# Patient Record
Sex: MALE | Race: WHITE | NOT HISPANIC OR LATINO | URBAN - METROPOLITAN AREA
[De-identification: names, ages, dates, MRNs, and addresses within clinical notes are randomized per-mention and may not be internally consistent; named-entity substitution may affect disease eponyms.]

---

## 2017-06-14 ENCOUNTER — OUTPATIENT (OUTPATIENT)
Dept: OUTPATIENT SERVICES | Facility: HOSPITAL | Age: 57
LOS: 1 days | Discharge: HOME | End: 2017-06-14

## 2017-06-22 DIAGNOSIS — M26.02 MAXILLARY HYPOPLASIA: ICD-10-CM

## 2017-06-22 DIAGNOSIS — E65 LOCALIZED ADIPOSITY: ICD-10-CM

## 2017-06-22 DIAGNOSIS — F32.9 MAJOR DEPRESSIVE DISORDER, SINGLE EPISODE, UNSPECIFIED: ICD-10-CM

## 2017-06-22 DIAGNOSIS — E78.5 HYPERLIPIDEMIA, UNSPECIFIED: ICD-10-CM

## 2017-06-22 DIAGNOSIS — I10 ESSENTIAL (PRIMARY) HYPERTENSION: ICD-10-CM

## 2017-06-22 DIAGNOSIS — M89.8X8 OTHER SPECIFIED DISORDERS OF BONE, OTHER SITE: ICD-10-CM

## 2021-01-16 ENCOUNTER — TRANSCRIPTION ENCOUNTER (OUTPATIENT)
Age: 61
End: 2021-01-16

## 2022-01-06 ENCOUNTER — OUTPATIENT (OUTPATIENT)
Dept: OUTPATIENT SERVICES | Facility: HOSPITAL | Age: 62
LOS: 1 days | End: 2022-01-06
Payer: COMMERCIAL

## 2022-01-06 DIAGNOSIS — Z20.828 CONTACT WITH AND (SUSPECTED) EXPOSURE TO OTHER VIRAL COMMUNICABLE DISEASES: ICD-10-CM

## 2022-01-06 LAB — SARS-COV-2 RNA SPEC QL NAA+PROBE: DETECTED

## 2022-01-06 PROCEDURE — U0003: CPT

## 2022-01-06 PROCEDURE — C9803: CPT

## 2022-01-06 PROCEDURE — U0005: CPT

## 2022-01-07 DIAGNOSIS — Z20.828 CONTACT WITH AND (SUSPECTED) EXPOSURE TO OTHER VIRAL COMMUNICABLE DISEASES: ICD-10-CM

## 2022-06-30 ENCOUNTER — NON-APPOINTMENT (OUTPATIENT)
Age: 62
End: 2022-06-30

## 2022-07-02 PROBLEM — Z00.00 ENCOUNTER FOR PREVENTIVE HEALTH EXAMINATION: Status: ACTIVE | Noted: 2022-07-02

## 2022-07-12 ENCOUNTER — APPOINTMENT (OUTPATIENT)
Dept: ORTHOPEDIC SURGERY | Facility: CLINIC | Age: 62
End: 2022-07-12

## 2022-07-12 VITALS — HEIGHT: 64 IN | WEIGHT: 160 LBS | BODY MASS INDEX: 27.31 KG/M2

## 2022-07-12 DIAGNOSIS — Z78.9 OTHER SPECIFIED HEALTH STATUS: ICD-10-CM

## 2022-07-12 DIAGNOSIS — M70.21 OLECRANON BURSITIS, RIGHT ELBOW: ICD-10-CM

## 2022-07-12 DIAGNOSIS — E78.00 PURE HYPERCHOLESTEROLEMIA, UNSPECIFIED: ICD-10-CM

## 2022-07-12 PROCEDURE — 99204 OFFICE O/P NEW MOD 45 MIN: CPT

## 2022-07-12 PROCEDURE — 73080 X-RAY EXAM OF ELBOW: CPT | Mod: RT

## 2022-07-12 RX ORDER — NAPROXEN 500 MG/1
500 TABLET ORAL
Qty: 60 | Refills: 2 | Status: ACTIVE | COMMUNITY
Start: 2022-07-12 | End: 1900-01-01

## 2022-07-12 RX ORDER — DICLOFENAC SODIUM 20 MG/G
2 SOLUTION TOPICAL
Qty: 1 | Refills: 2 | Status: ACTIVE | COMMUNITY
Start: 2022-07-12 | End: 1900-01-01

## 2022-07-12 NOTE — IMAGING
Lorri Abbasi  Identified pt with two pt identifiers(name and ). Chief Complaint   Patient presents with    Fibromyalgia     back pain/ ease pain but doesn't get rid of it/ took 500mg naproxen took rid of pain       Reviewed record In preparation for visit and have obtained necessary documentation. Has info on advanced directive but has not filled them out. 1. Have you been to the ER, urgent care clinic or hospitalized since your last visit? No     2. Have you seen or consulted any other health care providers outside of the [address] [place] [address] since your last visit? Include any pap smears or colon screening. No    Vitals reviewed with provider. Health Maintenance reviewed:     Health Maintenance Due   Topic    Pneumococcal 65+ Low/Medium Risk (2 of 2 - PPSV23)    Influenza Age 5 to Adult    ions  Wt Readings from Last 3 Encounters:   18 230 lb 6.4 oz (104.5 kg)   18 231 lb (104.8 kg)   18 228 lb (103.4 kg)   hip with someone who physically or mentally threatens you? N N   Is it safe for you to go home? Pietro Abebe    7/3/2018                        Temp Readings from Last 3 Encounters:   18 98.1 °F (36.7 °C) (Oral)   18 98.2 °F (36.8 °C) (Oral)   18 98.3 °F (36.8 °C) (Oral)               No Help Needed                   Climbing a flight of stairs    Getting to places beyond walking distances        7/3/2018 2018    N N   Fall Risk Assessment, last 12 mths 7/3/2018   Able to walk? Yes   Fall in past 12 months? Yes   Fall with injury?  No   Number of falls in past 12 months 1   Fall Risk Score 1    N N    Y Y [Left] : left elbow [de-identified] : The patient is a well appearing 61 year old M of their stated age.\par Neck is supple & nontender to palpation. Negative Spurling's test.\par \par Right Shoulder:       	\par ROM:\par Forward Flexion: 180 degrees\par Abduction: 180 degrees\par ER at 90: 90 degrees\par IR at 90: 45 degrees\par ER at N: 50 degrees\par Motor:\par Abduction: 5 out of 5\par FPS: 5 out of 5\par Flexion: 5 out of 5\par Internal Rotation: 5 out of 5\par External Rotation: 5 out of 5\par Provocative Testing:\par Impingement: Negative\par [place]'s: Negative\par Other: N/A\par \par Left Shoulder:\par ROM:\par Forward Flexion: 180 degrees\par Abduction: 180 degrees\par ER at 90: 90 degrees\par IR at 90: 45 degrees\par ER at N: 50 degrees\par Motor:\par Abduction: 5 out of 5\par FPS: 5 out of 5\par Flexion: 5 out of 5\par Internal Rotation: 5 out of 5\par External Rotation: 5 out of 5\par Provocative Testing:\par Impingement: Negative\par [place]'s: Negative\par Other: N/A\par _____________________\par Effected Elbow: right \par ROM:\par Flexion: 0-135 degrees\par Supination: 90 degrees\par Pronation: 90 degrees\par \par Inspection:\par Erythema: None\par Ecchymosis: None\par Abrasions: None\par Effusion: None\par Deformity: None\par \par Palpation:\par Crepitus: None\par Medial Epicondyle/Flexor-Pronator: Nontender\par Lateral Epicondyle/ECRB: Nontender\par Olecranon: Nontender\par Radial Head: Nontender\par Distal Biceps: Nontender\par Distal Triceps:  Nontender\par Flexor-Pronator: Nontender\par Extensor/ECRB: Nontender\par UCL: Nontender\par Pronator Intersection: Nontender\par Ulnar Nerve:  Stable & Nontender\par \par Stress Testing:\par Varus at 0 Degrees: Stable\par Varus at 30 Degrees: Stable\par Valgus at 0 Degrees: Stable\par Valgus at 30 Degrees: Stable\par \par Motor:\par Elbow Flexion: 5 out of 5\par Elbow Extension: 5 out of 5\par Supination: 5 out of 5\par Pronation: 5 out of 5\par Wrist Flexion: 5 out of 5\par Wrist Extension: 5 out of 5\par Interossei: 5 out of 5\par : 5 out of 5\par \par Provocative Testing:\par Milking: Negative\par Moving Valgus Stress: Negative\par Posterolateral R-I: Negative\par Hook Test: Negative\par Resisted Wrist Extension: No Pain\par Resisted Index Finger Extension: No Pain\par Resisted Middle Finger Extension: No Pain\par Resisted Wrist Flexion: No Pain\par Resisted Pronation: No Pain\par \par Neurologic Exam:\par Axillary Nerve:  SLT\par Radial Nerve: SLT\par Median Nerve: SLT\par Ulnar Nerve:  SLT\par Other:  N/A\par Vascular Exam:\par Radial Pulse: 2+\par Ulnar Pulse: 2+\par Capillary Refill: <2 Seconds\par Other Exams: None\par Pertinent Contralateral Elbow Findings: None\par \par Assessment: The patient is a 61 year old M with right elbow pain and radiographic and physical exam findings consistent with olecranon bursitis \par \par The patient’s condition is acute\par Documents/Results Reviewed Today: Xray right elbow \par Tests/Studies Independently Interpreted Today: Xray right elbow shows elbow arthritis and large olecranon spur\par Pertinent findings include: 0-135, 90/90\par Confounding medical conditions/concerns: Hx of right elbow bursitis\par \par Plan: PAtient provided with script for PT. They will wear reparel elbow sleeve. \par Tests Ordered: none\par Prescription Medications Ordered: Pennsaid, Naprosyn\par Braces/DME Ordered: none\par Activity/Work/Sports Status:  \par Additional Instructions: none\par Follow-Up: 4wks\par \par \par The patient's current medication management of their orthopedic diagnosis was reviewed today:\par (1) We discussed a comprehensive treatment plan that included possible pharmaceutical management involving the use of prescription strength medications including but not limited to options such as oral Naprosyn 500mg BID, once daily Meloxicam 15 mg, or 500-650 mg Tylenol versus over the counter oral medications and topical prescription NSAID Pennsaid vs over the counter Voltaren gel.\par (2) There is a moderate risk of morbidity with further treatment, especially from use of prescription strength medications and possible side effects of these medications which include upset stomach with oral medications, skin reactions to topical medications and cardiac/renal issues with long term use.\par (3) I recommended that the patient follow-up with their medical physician to discuss any significant specific potential issues with long term medication use such as interactions with current medications or with exacerbation of underlying medical comorbidities.\par (4) The benefits and risks associated with use of injectable, oral or topical, prescription and over the counter anti-inflammatory medications were discussed with the patient. The patient voiced understanding of the risks including but not limited to bleeding, stroke, kidney dysfunction, heart disease, and were referred to the black box warning label for further information.\par \par \par I, Marcelo Vargas, attest that this documentation has been prepared under the direction and in the presence of Provider Dr. Mojica\par \par The documentation recorded by the scribe accurately reflects the service I personally performed and the decisions made by me.\par \par \par  [FreeTextEntry1] : elbow arthritis and large olecranon spur

## 2022-07-12 NOTE — HISTORY OF PRESENT ILLNESS
[de-identified] : The patient is a 61 year old right hand dominant male  who presents today complaining of right elbow pain.  \par Date of Injury/Onset: 6/28/22\par Pain:    At Rest: 0/10 \par With Activity:  2/10 \par Mechanism of injury: Patient noticed sudden swelling over the right elbow. No JEFFREY. \par This is not a Work Related Injury being treated under Worker's Compensation.\par This is not an athletic injury occurring associated with an interscholastic or organized sports team.\par Quality of symptoms: soreness and localized swelling\par Improves with: rest\par Worse with: leaning, heavy lifting\par Prior treatment: Was here for similar scenario 3-4 years ago. Got it drained. Had to come back 3 more times for in\par Prior Imaging: None recent\par Out of work/sport: [No], since [n/a]\par School/Sport/Position/Occupation: \par Additional Information: [None]

## 2022-08-29 ENCOUNTER — APPOINTMENT (OUTPATIENT)
Dept: ORTHOPEDIC SURGERY | Facility: CLINIC | Age: 62
End: 2022-08-29

## 2023-03-27 ENCOUNTER — APPOINTMENT (OUTPATIENT)
Dept: UROLOGY | Facility: CLINIC | Age: 63
End: 2023-03-27
Payer: COMMERCIAL

## 2023-04-11 ENCOUNTER — APPOINTMENT (OUTPATIENT)
Dept: UROLOGY | Facility: CLINIC | Age: 63
End: 2023-04-11
Payer: COMMERCIAL

## 2023-04-11 VITALS
OXYGEN SATURATION: 98 % | SYSTOLIC BLOOD PRESSURE: 145 MMHG | HEIGHT: 64 IN | DIASTOLIC BLOOD PRESSURE: 92 MMHG | WEIGHT: 158 LBS | HEART RATE: 58 BPM | BODY MASS INDEX: 26.98 KG/M2 | RESPIRATION RATE: 16 BRPM

## 2023-04-11 DIAGNOSIS — N52.9 MALE ERECTILE DYSFUNCTION, UNSPECIFIED: ICD-10-CM

## 2023-04-11 DIAGNOSIS — N40.0 BENIGN PROSTATIC HYPERPLASIA WITHOUT LOWER URINARY TRACT SYMPMS: ICD-10-CM

## 2023-04-11 PROCEDURE — 99204 OFFICE O/P NEW MOD 45 MIN: CPT

## 2023-04-11 RX ORDER — TADALAFIL 5 MG/1
5 TABLET ORAL
Qty: 30 | Refills: 11 | Status: ACTIVE | COMMUNITY
Start: 2023-04-11 | End: 1900-01-01

## 2023-04-16 PROBLEM — N40.0 BPH WITHOUT OBSTRUCTION/LOWER URINARY TRACT SYMPTOMS: Status: ACTIVE | Noted: 2023-04-16

## 2023-04-18 NOTE — HISTORY OF PRESENT ILLNESS
[FreeTextEntry1] : This patient presents with erectile dysfunction.  He has tried tadalafil in the past with some but not complete results.  He denies any lower tract symptoms at this time. He states that his PSA and T are fine. PMHx includes HTN. PSHx is denied. Allergies to medications are denied. Family history shows no HTN in mother or father. Tobacco and ETOH use are both denied.

## 2023-04-18 NOTE — END OF VISIT
[FreeTextEntry3] : I called in Tadalafil at the 5mg dose to be taken on a prn basis. He will follow up with a duplex scan of the penis.

## 2023-04-18 NOTE — PHYSICAL EXAM
[General Appearance - Well Developed] : well developed [General Appearance - Well Nourished] : well nourished [Normal Appearance] : normal appearance [Well Groomed] : well groomed [General Appearance - In No Acute Distress] : no acute distress [Edema] : no peripheral edema [Respiration, Rhythm And Depth] : normal respiratory rhythm and effort [Exaggerated Use Of Accessory Muscles For Inspiration] : no accessory muscle use [Abdomen Soft] : soft [Abdomen Tenderness] : non-tender [Costovertebral Angle Tenderness] : no ~M costovertebral angle tenderness [Urethral Meatus] : meatus normal [Urinary Bladder Findings] : the bladder was normal on palpation [Scrotum] : the scrotum was normal [Testes Mass (___cm)] : there were no testicular masses [No Prostate Nodules] : no prostate nodules [Normal Station and Gait] : the gait and station were normal for the patient's age [] : no rash [No Focal Deficits] : no focal deficits [Oriented To Time, Place, And Person] : oriented to person, place, and time [Affect] : the affect was normal [Mood] : the mood was normal [Not Anxious] : not anxious [No Palpable Adenopathy] : no palpable adenopathy [FreeTextEntry1] : Normal male genitalia and a palpably benign prostate are noted

## 2023-05-15 ENCOUNTER — APPOINTMENT (OUTPATIENT)
Dept: UROLOGY | Facility: CLINIC | Age: 63
End: 2023-05-15

## 2023-11-29 ENCOUNTER — APPOINTMENT (OUTPATIENT)
Dept: ORTHOPEDIC SURGERY | Facility: CLINIC | Age: 63
End: 2023-11-29
Payer: COMMERCIAL

## 2023-11-29 VITALS — WEIGHT: 160 LBS | HEIGHT: 63 IN | BODY MASS INDEX: 28.35 KG/M2

## 2023-11-29 PROCEDURE — 99204 OFFICE O/P NEW MOD 45 MIN: CPT

## 2023-11-29 RX ORDER — ATORVASTATIN CALCIUM 80 MG/1
TABLET, FILM COATED ORAL
Refills: 0 | Status: ACTIVE | COMMUNITY

## 2023-11-29 RX ORDER — QUINAPRIL HYDROCHLORIDE 5 MG/1
TABLET, FILM COATED ORAL
Refills: 0 | Status: ACTIVE | COMMUNITY

## 2024-01-03 ENCOUNTER — OUTPATIENT (OUTPATIENT)
Dept: OUTPATIENT SERVICES | Facility: HOSPITAL | Age: 64
LOS: 1 days | End: 2024-01-03
Payer: COMMERCIAL

## 2024-01-03 ENCOUNTER — RESULT REVIEW (OUTPATIENT)
Age: 64
End: 2024-01-03

## 2024-01-03 VITALS
HEIGHT: 62 IN | SYSTOLIC BLOOD PRESSURE: 142 MMHG | HEART RATE: 61 BPM | RESPIRATION RATE: 16 BRPM | TEMPERATURE: 98 F | WEIGHT: 158.73 LBS | DIASTOLIC BLOOD PRESSURE: 75 MMHG | OXYGEN SATURATION: 98 %

## 2024-01-03 DIAGNOSIS — Z98.890 OTHER SPECIFIED POSTPROCEDURAL STATES: Chronic | ICD-10-CM

## 2024-01-03 DIAGNOSIS — Z01.818 ENCOUNTER FOR OTHER PREPROCEDURAL EXAMINATION: ICD-10-CM

## 2024-01-03 DIAGNOSIS — Z29.9 ENCOUNTER FOR PROPHYLACTIC MEASURES, UNSPECIFIED: ICD-10-CM

## 2024-01-03 DIAGNOSIS — Z87.81 PERSONAL HISTORY OF (HEALED) TRAUMATIC FRACTURE: Chronic | ICD-10-CM

## 2024-01-03 LAB
ABO RH CONFIRMATION: SIGNIFICANT CHANGE UP
ABO RH CONFIRMATION: SIGNIFICANT CHANGE UP
ALBUMIN SERPL ELPH-MCNC: 3.9 G/DL — SIGNIFICANT CHANGE UP (ref 3.3–5)
ALBUMIN SERPL ELPH-MCNC: 3.9 G/DL — SIGNIFICANT CHANGE UP (ref 3.3–5)
ANION GAP SERPL CALC-SCNC: 2 MMOL/L — LOW (ref 5–17)
ANION GAP SERPL CALC-SCNC: 2 MMOL/L — LOW (ref 5–17)
APTT BLD: 33.5 SEC — SIGNIFICANT CHANGE UP (ref 24.5–35.6)
APTT BLD: 33.5 SEC — SIGNIFICANT CHANGE UP (ref 24.5–35.6)
BASOPHILS # BLD AUTO: 0.04 K/UL — SIGNIFICANT CHANGE UP (ref 0–0.2)
BASOPHILS # BLD AUTO: 0.04 K/UL — SIGNIFICANT CHANGE UP (ref 0–0.2)
BASOPHILS NFR BLD AUTO: 0.5 % — SIGNIFICANT CHANGE UP (ref 0–2)
BASOPHILS NFR BLD AUTO: 0.5 % — SIGNIFICANT CHANGE UP (ref 0–2)
BLD GP AB SCN SERPL QL: SIGNIFICANT CHANGE UP
BLD GP AB SCN SERPL QL: SIGNIFICANT CHANGE UP
BUN SERPL-MCNC: 28 MG/DL — HIGH (ref 7–23)
BUN SERPL-MCNC: 28 MG/DL — HIGH (ref 7–23)
CALCIUM SERPL-MCNC: 9.3 MG/DL — SIGNIFICANT CHANGE UP (ref 8.5–10.1)
CALCIUM SERPL-MCNC: 9.3 MG/DL — SIGNIFICANT CHANGE UP (ref 8.5–10.1)
CHLORIDE SERPL-SCNC: 107 MMOL/L — SIGNIFICANT CHANGE UP (ref 96–108)
CHLORIDE SERPL-SCNC: 107 MMOL/L — SIGNIFICANT CHANGE UP (ref 96–108)
CO2 SERPL-SCNC: 29 MMOL/L — SIGNIFICANT CHANGE UP (ref 22–31)
CO2 SERPL-SCNC: 29 MMOL/L — SIGNIFICANT CHANGE UP (ref 22–31)
CREAT SERPL-MCNC: 0.94 MG/DL — SIGNIFICANT CHANGE UP (ref 0.5–1.3)
CREAT SERPL-MCNC: 0.94 MG/DL — SIGNIFICANT CHANGE UP (ref 0.5–1.3)
EGFR: 92 ML/MIN/1.73M2 — SIGNIFICANT CHANGE UP
EGFR: 92 ML/MIN/1.73M2 — SIGNIFICANT CHANGE UP
EOSINOPHIL # BLD AUTO: 0.11 K/UL — SIGNIFICANT CHANGE UP (ref 0–0.5)
EOSINOPHIL # BLD AUTO: 0.11 K/UL — SIGNIFICANT CHANGE UP (ref 0–0.5)
EOSINOPHIL NFR BLD AUTO: 1.4 % — SIGNIFICANT CHANGE UP (ref 0–6)
EOSINOPHIL NFR BLD AUTO: 1.4 % — SIGNIFICANT CHANGE UP (ref 0–6)
GLUCOSE SERPL-MCNC: 90 MG/DL — SIGNIFICANT CHANGE UP (ref 70–99)
GLUCOSE SERPL-MCNC: 90 MG/DL — SIGNIFICANT CHANGE UP (ref 70–99)
HCT VFR BLD CALC: 42.1 % — SIGNIFICANT CHANGE UP (ref 39–50)
HCT VFR BLD CALC: 42.1 % — SIGNIFICANT CHANGE UP (ref 39–50)
HGB BLD-MCNC: 14.1 G/DL — SIGNIFICANT CHANGE UP (ref 13–17)
HGB BLD-MCNC: 14.1 G/DL — SIGNIFICANT CHANGE UP (ref 13–17)
IMM GRANULOCYTES NFR BLD AUTO: 0.5 % — SIGNIFICANT CHANGE UP (ref 0–0.9)
IMM GRANULOCYTES NFR BLD AUTO: 0.5 % — SIGNIFICANT CHANGE UP (ref 0–0.9)
INR BLD: 0.86 RATIO — SIGNIFICANT CHANGE UP (ref 0.85–1.18)
INR BLD: 0.86 RATIO — SIGNIFICANT CHANGE UP (ref 0.85–1.18)
LYMPHOCYTES # BLD AUTO: 1.77 K/UL — SIGNIFICANT CHANGE UP (ref 1–3.3)
LYMPHOCYTES # BLD AUTO: 1.77 K/UL — SIGNIFICANT CHANGE UP (ref 1–3.3)
LYMPHOCYTES # BLD AUTO: 22.8 % — SIGNIFICANT CHANGE UP (ref 13–44)
LYMPHOCYTES # BLD AUTO: 22.8 % — SIGNIFICANT CHANGE UP (ref 13–44)
MCHC RBC-ENTMCNC: 29.6 PG — SIGNIFICANT CHANGE UP (ref 27–34)
MCHC RBC-ENTMCNC: 29.6 PG — SIGNIFICANT CHANGE UP (ref 27–34)
MCHC RBC-ENTMCNC: 33.5 GM/DL — SIGNIFICANT CHANGE UP (ref 32–36)
MCHC RBC-ENTMCNC: 33.5 GM/DL — SIGNIFICANT CHANGE UP (ref 32–36)
MCV RBC AUTO: 88.3 FL — SIGNIFICANT CHANGE UP (ref 80–100)
MCV RBC AUTO: 88.3 FL — SIGNIFICANT CHANGE UP (ref 80–100)
MONOCYTES # BLD AUTO: 0.59 K/UL — SIGNIFICANT CHANGE UP (ref 0–0.9)
MONOCYTES # BLD AUTO: 0.59 K/UL — SIGNIFICANT CHANGE UP (ref 0–0.9)
MONOCYTES NFR BLD AUTO: 7.6 % — SIGNIFICANT CHANGE UP (ref 2–14)
MONOCYTES NFR BLD AUTO: 7.6 % — SIGNIFICANT CHANGE UP (ref 2–14)
NEUTROPHILS # BLD AUTO: 5.21 K/UL — SIGNIFICANT CHANGE UP (ref 1.8–7.4)
NEUTROPHILS # BLD AUTO: 5.21 K/UL — SIGNIFICANT CHANGE UP (ref 1.8–7.4)
NEUTROPHILS NFR BLD AUTO: 67.2 % — SIGNIFICANT CHANGE UP (ref 43–77)
NEUTROPHILS NFR BLD AUTO: 67.2 % — SIGNIFICANT CHANGE UP (ref 43–77)
PLATELET # BLD AUTO: 234 K/UL — SIGNIFICANT CHANGE UP (ref 150–400)
PLATELET # BLD AUTO: 234 K/UL — SIGNIFICANT CHANGE UP (ref 150–400)
POTASSIUM SERPL-MCNC: 4.1 MMOL/L — SIGNIFICANT CHANGE UP (ref 3.5–5.3)
POTASSIUM SERPL-MCNC: 4.1 MMOL/L — SIGNIFICANT CHANGE UP (ref 3.5–5.3)
POTASSIUM SERPL-SCNC: 4.1 MMOL/L — SIGNIFICANT CHANGE UP (ref 3.5–5.3)
POTASSIUM SERPL-SCNC: 4.1 MMOL/L — SIGNIFICANT CHANGE UP (ref 3.5–5.3)
PROTHROM AB SERPL-ACNC: 9.8 SEC — SIGNIFICANT CHANGE UP (ref 9.5–13)
PROTHROM AB SERPL-ACNC: 9.8 SEC — SIGNIFICANT CHANGE UP (ref 9.5–13)
RBC # BLD: 4.77 M/UL — SIGNIFICANT CHANGE UP (ref 4.2–5.8)
RBC # BLD: 4.77 M/UL — SIGNIFICANT CHANGE UP (ref 4.2–5.8)
RBC # FLD: 12.4 % — SIGNIFICANT CHANGE UP (ref 10.3–14.5)
RBC # FLD: 12.4 % — SIGNIFICANT CHANGE UP (ref 10.3–14.5)
SODIUM SERPL-SCNC: 138 MMOL/L — SIGNIFICANT CHANGE UP (ref 135–145)
SODIUM SERPL-SCNC: 138 MMOL/L — SIGNIFICANT CHANGE UP (ref 135–145)
WBC # BLD: 7.76 K/UL — SIGNIFICANT CHANGE UP (ref 3.8–10.5)
WBC # BLD: 7.76 K/UL — SIGNIFICANT CHANGE UP (ref 3.8–10.5)
WBC # FLD AUTO: 7.76 K/UL — SIGNIFICANT CHANGE UP (ref 3.8–10.5)
WBC # FLD AUTO: 7.76 K/UL — SIGNIFICANT CHANGE UP (ref 3.8–10.5)

## 2024-01-03 PROCEDURE — 71046 X-RAY EXAM CHEST 2 VIEWS: CPT

## 2024-01-03 PROCEDURE — 82040 ASSAY OF SERUM ALBUMIN: CPT

## 2024-01-03 PROCEDURE — 87641 MR-STAPH DNA AMP PROBE: CPT

## 2024-01-03 PROCEDURE — 83036 HEMOGLOBIN GLYCOSYLATED A1C: CPT

## 2024-01-03 PROCEDURE — 93005 ELECTROCARDIOGRAM TRACING: CPT

## 2024-01-03 PROCEDURE — 80048 BASIC METABOLIC PNL TOTAL CA: CPT

## 2024-01-03 PROCEDURE — 93010 ELECTROCARDIOGRAM REPORT: CPT

## 2024-01-03 PROCEDURE — 99214 OFFICE O/P EST MOD 30 MIN: CPT | Mod: 25

## 2024-01-03 PROCEDURE — 85730 THROMBOPLASTIN TIME PARTIAL: CPT

## 2024-01-03 PROCEDURE — 86900 BLOOD TYPING SEROLOGIC ABO: CPT

## 2024-01-03 PROCEDURE — 85025 COMPLETE CBC W/AUTO DIFF WBC: CPT

## 2024-01-03 PROCEDURE — 85610 PROTHROMBIN TIME: CPT

## 2024-01-03 PROCEDURE — 86850 RBC ANTIBODY SCREEN: CPT

## 2024-01-03 PROCEDURE — 36415 COLL VENOUS BLD VENIPUNCTURE: CPT

## 2024-01-03 PROCEDURE — 86901 BLOOD TYPING SEROLOGIC RH(D): CPT

## 2024-01-03 PROCEDURE — 87640 STAPH A DNA AMP PROBE: CPT

## 2024-01-03 PROCEDURE — 71046 X-RAY EXAM CHEST 2 VIEWS: CPT | Mod: 26

## 2024-01-03 NOTE — H&P PST ADULT - NSANTHAGERD_ENT_A_CORE
Script sent to pt's pharmacy    PDMP reviewed; no aberrant behavior identified, prescription authorized.   Yes

## 2024-01-03 NOTE — H&P PST ADULT - ASSESSMENT
63 y.o male scheduled for a Left Total Hip  63 y.o male scheduled for a Left Total Hip Replacement  Plan  1. Stop all NSAIDS, herbal supplements and vitamins for 7 days.  2. NPO at midnight.  3. Take the following medications --none--- with small sips of water on the morning of your procedure/surgery.  4. Use EZ sponges as directed  5. Use mupirocin as directed  6. Labs, EKG, CXR, MRSA swab as per surgeon  7. PMD MONET Coreas and Dr Ruiz cardiologist visit for optimization prior to surgery as per surgeon.  8. Joint class completed today  9. Patient elzbieta require a rolling walker at home due to their dx of arthritis to help complete the MRADL's   10. Preprocedure education provided     CAPRINI SCORE    AGE RELATED RISK FACTORS                                                       MOBILITY RELATED FACTORS  [ ] Age 41-60 years                                            (1 Point)                  [ ] Bed rest                                                        (1 Point)  [x ] Age: 61-74 years                                           (2 Points)                [ ] Plaster cast                                                   (2 Points)  [ ] Age= 75 years                                              (3 Points)                 [ ] Bed bound for more than 72 hours                   (2 Points)    DISEASE RELATED RISK FACTORS                                               GENDER SPECIFIC FACTORS  [x ] Edema in the lower extremities                       (1 Point)                  [ ] Pregnancy                                                     (1 Point)  [ ] Varicose veins                                               (1 Point)                  [ ] Post-partum < 6 weeks                                   (1 Point)             [ x] BMI > 25 Kg/m2                                            (1 Point)                  [ ] Hormonal therapy  or oral contraception            (1 Point)                 [ ] Sepsis (in the previous month)                        (1 Point)                  [ ] History of pregnancy complications  [ ] Pneumonia or serious lung disease                                               [ ] Unexplained or recurrent                       (1 Point)           (in the previous month)                               (1 Point)  [ ] Abnormal pulmonary function test                     (1 Point)                 SURGERY RELATED RISK FACTORS  [ ] Acute myocardial infarction                              (1 Point)                 [ ]  Section                                            (1 Point)  [ ] Congestive heart failure (in the previous month)  (1 Point)                 [ ] Minor surgery                                                 (1 Point)   [ ] Inflammatory bowel disease                             (1 Point)                 [ ] Arthroscopic surgery                                        (2 Points)  [ ] Central venous access                                    (2 Points)                [ ] General surgery lasting more than 45 minutes   (2 Points)       [ ] Stroke (in the previous month)                          (5 Points)               [x ] Elective arthroplasty                                        (5 Points)                                                                                                                                               HEMATOLOGY RELATED FACTORS                                                 TRAUMA RELATED RISK FACTORS  [ ] Prior episodes of VTE                                     (3 Points)                 [ ] Fracture of the hip, pelvis, or leg                       (5 Points)  [ ] Positive family history for VTE                         (3 Points)                 [ ] Acute spinal cord injury (in the previous month)  (5 Points)  [ ] Prothrombin 81582 A                                      (3 Points)                 [ ] Paralysis  (less than 1 month)                          (5 Points)  [ ] Factor V Leiden                                             (3 Points)                 [ ] Multiple Trauma within 1 month                         (5 Points)  [ ] Lupus anticoagulants                                     (3 Points)                                                           [ ] Anticardiolipin antibodies                                (3 Points)                                                       [ ] High homocysteine in the blood                      (3 Points)                                             [ ] Other congenital or acquired thrombophilia       (3 Points)                                                [ ] Heparin induced thrombocytopenia                  (3 Points)                                          Total Score [        9  ]    The Caprini score indicates that this patient is at high risk for a VTE event (score > = 6).    Surgical patients in this group will benefit from both pharmacologic prophylaxis and intermittent compression devices.    The surgical team will determine the balance between VTE risk and bleeding risk, and other clinical considerations.  63 y.o male scheduled for a Left Total Hip Replacement  Plan  1. Stop all NSAIDS, herbal supplements and vitamins for 7 days.  2. NPO at midnight.  3. Take the following medications --none--- with small sips of water on the morning of your procedure/surgery.  4. Use EZ sponges as directed  5. Use mupirocin as directed  6. Labs, EKG, CXR, MRSA swab as per surgeon  7. PMD MONET Coreas and Dr Ruiz cardiologist visit for optimization prior to surgery as per surgeon.  8. Joint class completed today  9. Patient elzbieta require a rolling walker at home due to their dx of arthritis to help complete the MRADL's   10. Preprocedure education provided     CAPRINI SCORE    AGE RELATED RISK FACTORS                                                       MOBILITY RELATED FACTORS  [ ] Age 41-60 years                                            (1 Point)                  [ ] Bed rest                                                        (1 Point)  [x ] Age: 61-74 years                                           (2 Points)                [ ] Plaster cast                                                   (2 Points)  [ ] Age= 75 years                                              (3 Points)                 [ ] Bed bound for more than 72 hours                   (2 Points)    DISEASE RELATED RISK FACTORS                                               GENDER SPECIFIC FACTORS  [x ] Edema in the lower extremities                       (1 Point)                  [ ] Pregnancy                                                     (1 Point)  [ ] Varicose veins                                               (1 Point)                  [ ] Post-partum < 6 weeks                                   (1 Point)             [ x] BMI > 25 Kg/m2                                            (1 Point)                  [ ] Hormonal therapy  or oral contraception            (1 Point)                 [ ] Sepsis (in the previous month)                        (1 Point)                  [ ] History of pregnancy complications  [ ] Pneumonia or serious lung disease                                               [ ] Unexplained or recurrent                       (1 Point)           (in the previous month)                               (1 Point)  [ ] Abnormal pulmonary function test                     (1 Point)                 SURGERY RELATED RISK FACTORS  [ ] Acute myocardial infarction                              (1 Point)                 [ ]  Section                                            (1 Point)  [ ] Congestive heart failure (in the previous month)  (1 Point)                 [ ] Minor surgery                                                 (1 Point)   [ ] Inflammatory bowel disease                             (1 Point)                 [ ] Arthroscopic surgery                                        (2 Points)  [ ] Central venous access                                    (2 Points)                [ ] General surgery lasting more than 45 minutes   (2 Points)       [ ] Stroke (in the previous month)                          (5 Points)               [x ] Elective arthroplasty                                        (5 Points)                                                                                                                                               HEMATOLOGY RELATED FACTORS                                                 TRAUMA RELATED RISK FACTORS  [ ] Prior episodes of VTE                                     (3 Points)                 [ ] Fracture of the hip, pelvis, or leg                       (5 Points)  [ ] Positive family history for VTE                         (3 Points)                 [ ] Acute spinal cord injury (in the previous month)  (5 Points)  [ ] Prothrombin 91540 A                                      (3 Points)                 [ ] Paralysis  (less than 1 month)                          (5 Points)  [ ] Factor V Leiden                                             (3 Points)                 [ ] Multiple Trauma within 1 month                         (5 Points)  [ ] Lupus anticoagulants                                     (3 Points)                                                           [ ] Anticardiolipin antibodies                                (3 Points)                                                       [ ] High homocysteine in the blood                      (3 Points)                                             [ ] Other congenital or acquired thrombophilia       (3 Points)                                                [ ] Heparin induced thrombocytopenia                  (3 Points)                                          Total Score [        9  ]    The Caprini score indicates that this patient is at high risk for a VTE event (score > = 6).    Surgical patients in this group will benefit from both pharmacologic prophylaxis and intermittent compression devices.    The surgical team will determine the balance between VTE risk and bleeding risk, and other clinical considerations.

## 2024-01-03 NOTE — H&P PST ADULT - HISTORY OF PRESENT ILLNESS
63 y.o WD, WN male presents to PST with hx of left hip pain, advanced arthritis in the left hip. His hx is signific 63 y.o WD, WN male presents to PST with hx of left hip pain, advanced arthritis in the left hip. Patient's left hip pain is impacting his life activities. His hx is significant for HTN and Hyperlipidemia. He is scheduled for a Left Total Hip Replacement

## 2024-01-03 NOTE — H&P PST ADULT - NSICDXPASTMEDICALHX_GEN_ALL_CORE_FT
PAST MEDICAL HISTORY:  Anxiety     Arthritis     GERD (gastroesophageal reflux disease)     High cholesterol     Hypertension     Lumbar herniated disc     Lumbar spinal stenosis

## 2024-01-04 DIAGNOSIS — Z01.818 ENCOUNTER FOR OTHER PREPROCEDURAL EXAMINATION: ICD-10-CM

## 2024-01-04 LAB
A1C WITH ESTIMATED AVERAGE GLUCOSE RESULT: 5.4 % — SIGNIFICANT CHANGE UP (ref 4–5.6)
A1C WITH ESTIMATED AVERAGE GLUCOSE RESULT: 5.4 % — SIGNIFICANT CHANGE UP (ref 4–5.6)
ESTIMATED AVERAGE GLUCOSE: 108 MG/DL — SIGNIFICANT CHANGE UP (ref 68–114)
ESTIMATED AVERAGE GLUCOSE: 108 MG/DL — SIGNIFICANT CHANGE UP (ref 68–114)
MRSA PCR RESULT.: SIGNIFICANT CHANGE UP
MRSA PCR RESULT.: SIGNIFICANT CHANGE UP
S AUREUS DNA NOSE QL NAA+PROBE: SIGNIFICANT CHANGE UP
S AUREUS DNA NOSE QL NAA+PROBE: SIGNIFICANT CHANGE UP

## 2024-01-08 RX ORDER — SODIUM CHLORIDE 9 MG/ML
1000 INJECTION, SOLUTION INTRAVENOUS
Refills: 0 | Status: DISCONTINUED | OUTPATIENT
Start: 2024-01-11 | End: 2024-01-12

## 2024-01-08 RX ORDER — OXYCODONE HYDROCHLORIDE 5 MG/1
5 TABLET ORAL EVERY 4 HOURS
Refills: 0 | Status: DISCONTINUED | OUTPATIENT
Start: 2024-01-11 | End: 2024-01-12

## 2024-01-08 RX ORDER — HYDROMORPHONE HYDROCHLORIDE 2 MG/ML
0.5 INJECTION INTRAMUSCULAR; INTRAVENOUS; SUBCUTANEOUS EVERY 4 HOURS
Refills: 0 | Status: DISCONTINUED | OUTPATIENT
Start: 2024-01-11 | End: 2024-01-12

## 2024-01-08 RX ORDER — ACETAMINOPHEN 500 MG
1000 TABLET ORAL EVERY 8 HOURS
Refills: 0 | Status: DISCONTINUED | OUTPATIENT
Start: 2024-01-11 | End: 2024-01-12

## 2024-01-08 RX ORDER — SENNA PLUS 8.6 MG/1
2 TABLET ORAL AT BEDTIME
Refills: 0 | Status: DISCONTINUED | OUTPATIENT
Start: 2024-01-11 | End: 2024-01-12

## 2024-01-08 RX ORDER — POLYETHYLENE GLYCOL 3350 17 G/17G
17 POWDER, FOR SOLUTION ORAL AT BEDTIME
Refills: 0 | Status: DISCONTINUED | OUTPATIENT
Start: 2024-01-11 | End: 2024-01-12

## 2024-01-08 RX ORDER — ASPIRIN/CALCIUM CARB/MAGNESIUM 324 MG
81 TABLET ORAL
Refills: 0 | Status: DISCONTINUED | OUTPATIENT
Start: 2024-01-11 | End: 2024-01-12

## 2024-01-08 RX ORDER — TRANEXAMIC ACID 100 MG/ML
1000 INJECTION, SOLUTION INTRAVENOUS ONCE
Refills: 0 | Status: DISCONTINUED | OUTPATIENT
Start: 2024-01-11 | End: 2024-01-12

## 2024-01-08 RX ORDER — PANTOPRAZOLE SODIUM 20 MG/1
40 TABLET, DELAYED RELEASE ORAL
Refills: 0 | Status: DISCONTINUED | OUTPATIENT
Start: 2024-01-11 | End: 2024-01-12

## 2024-01-08 RX ORDER — OXYCODONE HYDROCHLORIDE 5 MG/1
10 TABLET ORAL EVERY 4 HOURS
Refills: 0 | Status: DISCONTINUED | OUTPATIENT
Start: 2024-01-11 | End: 2024-01-12

## 2024-01-08 RX ORDER — ONDANSETRON 8 MG/1
4 TABLET, FILM COATED ORAL EVERY 6 HOURS
Refills: 0 | Status: DISCONTINUED | OUTPATIENT
Start: 2024-01-11 | End: 2024-01-12

## 2024-01-08 RX ORDER — CELECOXIB 200 MG/1
200 CAPSULE ORAL EVERY 12 HOURS
Refills: 0 | Status: DISCONTINUED | OUTPATIENT
Start: 2024-01-11 | End: 2024-01-12

## 2024-01-09 PROBLEM — M48.061 SPINAL STENOSIS, LUMBAR REGION WITHOUT NEUROGENIC CLAUDICATION: Chronic | Status: ACTIVE | Noted: 2024-01-03

## 2024-01-09 PROBLEM — F41.9 ANXIETY DISORDER, UNSPECIFIED: Chronic | Status: ACTIVE | Noted: 2024-01-03

## 2024-01-09 PROBLEM — M51.26 OTHER INTERVERTEBRAL DISC DISPLACEMENT, LUMBAR REGION: Chronic | Status: ACTIVE | Noted: 2024-01-03

## 2024-01-09 PROBLEM — M19.90 UNSPECIFIED OSTEOARTHRITIS, UNSPECIFIED SITE: Chronic | Status: ACTIVE | Noted: 2024-01-03

## 2024-01-09 PROBLEM — K21.9 GASTRO-ESOPHAGEAL REFLUX DISEASE WITHOUT ESOPHAGITIS: Chronic | Status: ACTIVE | Noted: 2024-01-03

## 2024-01-11 ENCOUNTER — RESULT REVIEW (OUTPATIENT)
Age: 64
End: 2024-01-11

## 2024-01-11 ENCOUNTER — INPATIENT (INPATIENT)
Facility: HOSPITAL | Age: 64
LOS: 0 days | Discharge: HOME | DRG: 470 | End: 2024-01-12
Attending: ORTHOPAEDIC SURGERY | Admitting: ORTHOPAEDIC SURGERY
Payer: COMMERCIAL

## 2024-01-11 ENCOUNTER — APPOINTMENT (OUTPATIENT)
Dept: ORTHOPEDIC SURGERY | Facility: HOSPITAL | Age: 64
End: 2024-01-11

## 2024-01-11 ENCOUNTER — TRANSCRIPTION ENCOUNTER (OUTPATIENT)
Age: 64
End: 2024-01-11

## 2024-01-11 VITALS
HEIGHT: 62 IN | OXYGEN SATURATION: 97 % | WEIGHT: 158.73 LBS | HEART RATE: 68 BPM | SYSTOLIC BLOOD PRESSURE: 130 MMHG | RESPIRATION RATE: 18 BRPM | TEMPERATURE: 98 F | DIASTOLIC BLOOD PRESSURE: 76 MMHG

## 2024-01-11 DIAGNOSIS — Z87.81 PERSONAL HISTORY OF (HEALED) TRAUMATIC FRACTURE: Chronic | ICD-10-CM

## 2024-01-11 DIAGNOSIS — Z98.890 OTHER SPECIFIED POSTPROCEDURAL STATES: Chronic | ICD-10-CM

## 2024-01-11 DIAGNOSIS — M16.12 UNILATERAL PRIMARY OSTEOARTHRITIS, LEFT HIP: ICD-10-CM

## 2024-01-11 LAB
ANION GAP SERPL CALC-SCNC: 2 MMOL/L — LOW (ref 5–17)
ANION GAP SERPL CALC-SCNC: 2 MMOL/L — LOW (ref 5–17)
BUN SERPL-MCNC: 24 MG/DL — HIGH (ref 7–23)
BUN SERPL-MCNC: 24 MG/DL — HIGH (ref 7–23)
CALCIUM SERPL-MCNC: 8.6 MG/DL — SIGNIFICANT CHANGE UP (ref 8.5–10.1)
CALCIUM SERPL-MCNC: 8.6 MG/DL — SIGNIFICANT CHANGE UP (ref 8.5–10.1)
CHLORIDE SERPL-SCNC: 106 MMOL/L — SIGNIFICANT CHANGE UP (ref 96–108)
CHLORIDE SERPL-SCNC: 106 MMOL/L — SIGNIFICANT CHANGE UP (ref 96–108)
CO2 SERPL-SCNC: 28 MMOL/L — SIGNIFICANT CHANGE UP (ref 22–31)
CO2 SERPL-SCNC: 28 MMOL/L — SIGNIFICANT CHANGE UP (ref 22–31)
CREAT SERPL-MCNC: 1 MG/DL — SIGNIFICANT CHANGE UP (ref 0.5–1.3)
CREAT SERPL-MCNC: 1 MG/DL — SIGNIFICANT CHANGE UP (ref 0.5–1.3)
EGFR: 85 ML/MIN/1.73M2 — SIGNIFICANT CHANGE UP
EGFR: 85 ML/MIN/1.73M2 — SIGNIFICANT CHANGE UP
GLUCOSE BLDC GLUCOMTR-MCNC: 107 MG/DL — HIGH (ref 70–99)
GLUCOSE BLDC GLUCOMTR-MCNC: 107 MG/DL — HIGH (ref 70–99)
GLUCOSE BLDC GLUCOMTR-MCNC: 135 MG/DL — HIGH (ref 70–99)
GLUCOSE BLDC GLUCOMTR-MCNC: 135 MG/DL — HIGH (ref 70–99)
GLUCOSE BLDC GLUCOMTR-MCNC: 91 MG/DL — SIGNIFICANT CHANGE UP (ref 70–99)
GLUCOSE BLDC GLUCOMTR-MCNC: 91 MG/DL — SIGNIFICANT CHANGE UP (ref 70–99)
GLUCOSE SERPL-MCNC: 97 MG/DL — SIGNIFICANT CHANGE UP (ref 70–99)
GLUCOSE SERPL-MCNC: 97 MG/DL — SIGNIFICANT CHANGE UP (ref 70–99)
HCT VFR BLD CALC: 37.6 % — LOW (ref 39–50)
HCT VFR BLD CALC: 37.6 % — LOW (ref 39–50)
HGB BLD-MCNC: 12.3 G/DL — LOW (ref 13–17)
HGB BLD-MCNC: 12.3 G/DL — LOW (ref 13–17)
MCHC RBC-ENTMCNC: 29.1 PG — SIGNIFICANT CHANGE UP (ref 27–34)
MCHC RBC-ENTMCNC: 29.1 PG — SIGNIFICANT CHANGE UP (ref 27–34)
MCHC RBC-ENTMCNC: 32.7 GM/DL — SIGNIFICANT CHANGE UP (ref 32–36)
MCHC RBC-ENTMCNC: 32.7 GM/DL — SIGNIFICANT CHANGE UP (ref 32–36)
MCV RBC AUTO: 89.1 FL — SIGNIFICANT CHANGE UP (ref 80–100)
MCV RBC AUTO: 89.1 FL — SIGNIFICANT CHANGE UP (ref 80–100)
PLATELET # BLD AUTO: 222 K/UL — SIGNIFICANT CHANGE UP (ref 150–400)
PLATELET # BLD AUTO: 222 K/UL — SIGNIFICANT CHANGE UP (ref 150–400)
POTASSIUM SERPL-MCNC: 4.5 MMOL/L — SIGNIFICANT CHANGE UP (ref 3.5–5.3)
POTASSIUM SERPL-MCNC: 4.5 MMOL/L — SIGNIFICANT CHANGE UP (ref 3.5–5.3)
POTASSIUM SERPL-SCNC: 4.5 MMOL/L — SIGNIFICANT CHANGE UP (ref 3.5–5.3)
POTASSIUM SERPL-SCNC: 4.5 MMOL/L — SIGNIFICANT CHANGE UP (ref 3.5–5.3)
RBC # BLD: 4.22 M/UL — SIGNIFICANT CHANGE UP (ref 4.2–5.8)
RBC # BLD: 4.22 M/UL — SIGNIFICANT CHANGE UP (ref 4.2–5.8)
RBC # FLD: 12.4 % — SIGNIFICANT CHANGE UP (ref 10.3–14.5)
RBC # FLD: 12.4 % — SIGNIFICANT CHANGE UP (ref 10.3–14.5)
SODIUM SERPL-SCNC: 136 MMOL/L — SIGNIFICANT CHANGE UP (ref 135–145)
SODIUM SERPL-SCNC: 136 MMOL/L — SIGNIFICANT CHANGE UP (ref 135–145)
WBC # BLD: 11.73 K/UL — HIGH (ref 3.8–10.5)
WBC # BLD: 11.73 K/UL — HIGH (ref 3.8–10.5)
WBC # FLD AUTO: 11.73 K/UL — HIGH (ref 3.8–10.5)
WBC # FLD AUTO: 11.73 K/UL — HIGH (ref 3.8–10.5)

## 2024-01-11 PROCEDURE — 97530 THERAPEUTIC ACTIVITIES: CPT | Mod: GP

## 2024-01-11 PROCEDURE — 88311 DECALCIFY TISSUE: CPT | Mod: 26

## 2024-01-11 PROCEDURE — C1713: CPT

## 2024-01-11 PROCEDURE — 82962 GLUCOSE BLOOD TEST: CPT

## 2024-01-11 PROCEDURE — 88311 DECALCIFY TISSUE: CPT

## 2024-01-11 PROCEDURE — 36415 COLL VENOUS BLD VENIPUNCTURE: CPT

## 2024-01-11 PROCEDURE — 80048 BASIC METABOLIC PNL TOTAL CA: CPT

## 2024-01-11 PROCEDURE — C1776: CPT

## 2024-01-11 PROCEDURE — 97162 PT EVAL MOD COMPLEX 30 MIN: CPT | Mod: GP

## 2024-01-11 PROCEDURE — 73501 X-RAY EXAM HIP UNI 1 VIEW: CPT | Mod: LT

## 2024-01-11 PROCEDURE — 97116 GAIT TRAINING THERAPY: CPT | Mod: GP

## 2024-01-11 PROCEDURE — 88305 TISSUE EXAM BY PATHOLOGIST: CPT

## 2024-01-11 PROCEDURE — 85027 COMPLETE CBC AUTOMATED: CPT

## 2024-01-11 PROCEDURE — 99253 IP/OBS CNSLTJ NEW/EST LOW 45: CPT

## 2024-01-11 PROCEDURE — 88305 TISSUE EXAM BY PATHOLOGIST: CPT | Mod: 26

## 2024-01-11 PROCEDURE — 73501 X-RAY EXAM HIP UNI 1 VIEW: CPT | Mod: 26,LT

## 2024-01-11 PROCEDURE — 27130 TOTAL HIP ARTHROPLASTY: CPT | Mod: LT

## 2024-01-11 RX ORDER — ATORVASTATIN CALCIUM 80 MG/1
1 TABLET, FILM COATED ORAL
Refills: 0 | DISCHARGE

## 2024-01-11 RX ORDER — CEFAZOLIN SODIUM 1 G
2000 VIAL (EA) INJECTION EVERY 8 HOURS
Refills: 0 | Status: DISCONTINUED | OUTPATIENT
Start: 2024-01-11 | End: 2024-01-11

## 2024-01-11 RX ORDER — IRBESARTAN 75 MG/1
1 TABLET ORAL
Refills: 0 | DISCHARGE

## 2024-01-11 RX ORDER — DEXAMETHASONE 0.5 MG/5ML
8 ELIXIR ORAL ONCE
Refills: 0 | Status: COMPLETED | OUTPATIENT
Start: 2024-01-12 | End: 2024-01-12

## 2024-01-11 RX ORDER — ASPIRIN/CALCIUM CARB/MAGNESIUM 324 MG
1 TABLET ORAL
Qty: 56 | Refills: 0
Start: 2024-01-11 | End: 2024-02-07

## 2024-01-11 RX ORDER — OXYCODONE HYDROCHLORIDE 5 MG/1
5 TABLET ORAL ONCE
Refills: 0 | Status: DISCONTINUED | OUTPATIENT
Start: 2024-01-11 | End: 2024-01-11

## 2024-01-11 RX ORDER — ACETAMINOPHEN 500 MG
1000 TABLET ORAL ONCE
Refills: 0 | Status: COMPLETED | OUTPATIENT
Start: 2024-01-11 | End: 2024-01-11

## 2024-01-11 RX ORDER — PANTOPRAZOLE SODIUM 20 MG/1
1 TABLET, DELAYED RELEASE ORAL
Qty: 30 | Refills: 0
Start: 2024-01-11 | End: 2024-02-09

## 2024-01-11 RX ORDER — SERTRALINE 25 MG/1
1 TABLET, FILM COATED ORAL
Refills: 0 | DISCHARGE

## 2024-01-11 RX ORDER — ACETAMINOPHEN 500 MG
2 TABLET ORAL
Qty: 84 | Refills: 0
Start: 2024-01-11 | End: 2024-01-24

## 2024-01-11 RX ORDER — ATORVASTATIN CALCIUM 80 MG/1
10 TABLET, FILM COATED ORAL AT BEDTIME
Refills: 0 | Status: DISCONTINUED | OUTPATIENT
Start: 2024-01-11 | End: 2024-01-12

## 2024-01-11 RX ORDER — ONDANSETRON 8 MG/1
4 TABLET, FILM COATED ORAL ONCE
Refills: 0 | Status: DISCONTINUED | OUTPATIENT
Start: 2024-01-11 | End: 2024-01-11

## 2024-01-11 RX ORDER — LOSARTAN POTASSIUM 100 MG/1
100 TABLET, FILM COATED ORAL DAILY
Refills: 0 | Status: DISCONTINUED | OUTPATIENT
Start: 2024-01-11 | End: 2024-01-12

## 2024-01-11 RX ORDER — SENNA PLUS 8.6 MG/1
1 TABLET ORAL
Qty: 7 | Refills: 0
Start: 2024-01-11 | End: 2024-01-17

## 2024-01-11 RX ORDER — POLYETHYLENE GLYCOL 3350 17 G/17G
17 POWDER, FOR SOLUTION ORAL
Qty: 1 | Refills: 0
Start: 2024-01-11

## 2024-01-11 RX ORDER — OXYCODONE HYDROCHLORIDE 5 MG/1
1 TABLET ORAL
Qty: 30 | Refills: 0
Start: 2024-01-11

## 2024-01-11 RX ORDER — CEFAZOLIN SODIUM 1 G
2000 VIAL (EA) INJECTION EVERY 8 HOURS
Refills: 0 | Status: COMPLETED | OUTPATIENT
Start: 2024-01-11 | End: 2024-01-12

## 2024-01-11 RX ORDER — SODIUM CHLORIDE 9 MG/ML
1000 INJECTION, SOLUTION INTRAVENOUS
Refills: 0 | Status: DISCONTINUED | OUTPATIENT
Start: 2024-01-11 | End: 2024-01-11

## 2024-01-11 RX ORDER — SERTRALINE 25 MG/1
50 TABLET, FILM COATED ORAL DAILY
Refills: 0 | Status: DISCONTINUED | OUTPATIENT
Start: 2024-01-11 | End: 2024-01-12

## 2024-01-11 RX ORDER — FENTANYL CITRATE 50 UG/ML
25 INJECTION INTRAVENOUS
Refills: 0 | Status: DISCONTINUED | OUTPATIENT
Start: 2024-01-11 | End: 2024-01-11

## 2024-01-11 RX ADMIN — Medication 81 MILLIGRAM(S): at 22:14

## 2024-01-11 RX ADMIN — PANTOPRAZOLE SODIUM 40 MILLIGRAM(S): 20 TABLET, DELAYED RELEASE ORAL at 07:28

## 2024-01-11 RX ADMIN — LOSARTAN POTASSIUM 100 MILLIGRAM(S): 100 TABLET, FILM COATED ORAL at 17:38

## 2024-01-11 RX ADMIN — Medication 400 MILLIGRAM(S): at 22:15

## 2024-01-11 RX ADMIN — CELECOXIB 200 MILLIGRAM(S): 200 CAPSULE ORAL at 22:45

## 2024-01-11 RX ADMIN — Medication 1 TABLET(S): at 22:14

## 2024-01-11 RX ADMIN — Medication 400 MILLIGRAM(S): at 15:06

## 2024-01-11 RX ADMIN — Medication 2000 MILLIGRAM(S): at 17:23

## 2024-01-11 RX ADMIN — SERTRALINE 50 MILLIGRAM(S): 25 TABLET, FILM COATED ORAL at 17:23

## 2024-01-11 RX ADMIN — CELECOXIB 200 MILLIGRAM(S): 200 CAPSULE ORAL at 22:15

## 2024-01-11 RX ADMIN — Medication 1000 MILLIGRAM(S): at 02:22

## 2024-01-11 RX ADMIN — ATORVASTATIN CALCIUM 10 MILLIGRAM(S): 80 TABLET, FILM COATED ORAL at 17:38

## 2024-01-11 NOTE — DISCHARGE NOTE PROVIDER - CARE PROVIDER_API CALL
KAYLAN QUINN  [address] [place] [id number]  Phone: [phone]  Follow Up Time:    KAYLAN QUINN  [address] [place] [id number]  Phone: [phone]  Follow Up Time:

## 2024-01-11 NOTE — DISCHARGE NOTE PROVIDER - HOSPITAL COURSE
H&P:  Pt is a 63y Male    PAST MEDICAL & SURGICAL HISTORY:  Hypertension      High cholesterol      Arthritis      Anxiety      Lumbar herniated disc      Lumbar spinal stenosis      GERD (gastroesophageal reflux disease)      H/O fracture of arm      H/O cosmetic surgery      H/O colonoscopy          Now s/p Left Total Hip Arthroplasty. Pt is afebrile with stable vital signs. Pain is controlled. Alert and Oriented. Exam intact EHL FHL TA GS, +DP. Dressing is clean and dry.    Hospital Course:  Patient presented to [facility] medically cleared for elective Left Hip Replacement Surgery, having failed outpatient conservative management. Prophylactic antibiotics were started before the procedure and continued for 24 hours. They were admitted after surgery to the orthopedic floor.   There were no complications during the hospital stay. All home medications were continued.     Routine consults were obtained from Physical Therapy for twice daily PT, and from the Hospitalist for Medical Co-management. Patient was placed on anticoagulation.  Pertinent home medications were continued.  Daily labs were followed.      POD 0 pt was stable overnight.  No Major issues post op. Pt received PT twice daily. The plan is for for DC to home with home PT. The orthopedic Attending is aware and agrees.    **POD1 DC DOCUMENTAION** (Keep or Delete depending on scenario)  The patient had no post-operative complications and clinically progressed faster than anticipated. The following condition(s) were actively treated during the hospital stay:  HTN  The patient met criteria for discharge before the 2nd night of the stay. The patient was appropriately and safely discharged home with home PT.    ******INCOMPLETE NOTE IN PREPARATION FOR DISPO PLANNING*******  ******INCOMPLETE NOTE IN PREPARATION FOR DISPO PLANNING*******  ******INCOMPLETE NOTE IN PREPARATION FOR DISPO PLANNING******* H&P:  Pt is a 63y Male    PAST MEDICAL & SURGICAL HISTORY:  Hypertension      High cholesterol      Arthritis      Anxiety      Lumbar herniated disc      Lumbar spinal stenosis      GERD (gastroesophageal reflux disease)      H/O fracture of arm      H/O cosmetic surgery      H/O colonoscopy          Now s/p Left Total Hip Arthroplasty. Pt is afebrile with stable vital signs. Pain is controlled. Alert and Oriented. Exam intact EHL FHL TA GS, +DP. Dressing is clean and dry.    Hospital Course:  Patient presented to [facility] medically cleared for elective Left Hip Replacement Surgery, having failed outpatient conservative management. Prophylactic antibiotics were started before the procedure and continued for 24 hours. They were admitted after surgery to the orthopedic floor.   There were no complications during the hospital stay. All home medications were continued.     Routine consults were obtained from Physical Therapy for twice daily PT, and from the Hospitalist for Medical Co-management. Patient was placed on anticoagulation.  Pertinent home medications were continued.  Daily labs were followed.      POD 0 pt was stable overnight.  No Major issues post op. Pt received PT twice daily. The plan is for for DC to home with home PT. The orthopedic Attending is aware and agrees.    **POD1 DC DOCUMENTAION** (Keep or Delete depending on scenario)  The patient had no post-operative complications and clinically progressed faster than anticipated. The following condition(s) were actively treated during the hospital stay:  HTN  The patient met criteria for discharge before the 2nd night of the stay. The patient was appropriately and safely discharged home with home PT.    ******INCOMPLETE NOTE IN PREPARATION FOR DISPO PLANNING*******  ******INCOMPLETE NOTE IN PREPARATION FOR DISPO PLANNING*******  ******INCOMPLETE NOTE IN PREPARATION FOR DISPO PLANNING******* H&P:  Pt is a 63y Male    PAST MEDICAL & SURGICAL HISTORY:  Hypertension      High cholesterol      Arthritis      Anxiety      Lumbar herniated disc      Lumbar spinal stenosis      GERD (gastroesophageal reflux disease)      H/O fracture of arm      H/O cosmetic surgery      H/O colonoscopy          Now s/p Left Total Hip Arthroplasty. Pt is afebrile with stable vital signs. Pain is controlled. Alert and Oriented. Exam intact EHL FHL TA GS, +DP. Dressing is clean and dry.    Hospital Course:  Patient presented to [facility] medically cleared for elective Left Hip Replacement Surgery, having failed outpatient conservative management. Prophylactic antibiotics were started before the procedure and continued for 24 hours. They were admitted after surgery to the orthopedic floor.   There were no complications during the hospital stay. All home medications were continued.     Routine consults were obtained from Physical Therapy for twice daily PT, and from the Hospitalist for Medical Co-management. Patient was placed on anticoagulation.  Pertinent home medications were continued.  Daily labs were followed.      POD 0 pt was stable overnight.  No Major issues post op. Pt received PT twice daily. The plan is for for DC to home with home PT. The orthopedic Attending is aware and agrees.      The patient had no post-operative complications and clinically progressed faster than anticipated. The following condition(s) were actively treated during the hospital stay:  HTN  The patient met criteria for discharge before the 2nd night of the stay. The patient was appropriately and safely discharged home with home PT.     H&P:  Pt is a 63y Male    PAST MEDICAL & SURGICAL HISTORY:  Hypertension      High cholesterol      Arthritis      Anxiety      Lumbar herniated disc      Lumbar spinal stenosis      GERD (gastroesophageal reflux disease)      H/O fracture of arm      H/O cosmetic surgery      H/O colonoscopy          Now s/p Left Total Hip Arthroplasty. Pt is afebrile with stable vital signs. Pain is controlled. Alert and Oriented. Exam intact EHL FHL TA GS, +DP. Dressing is clean and dry.    Hospital Course:  Patient presented to [facility] medically cleared for elective Left Hip Replacement Surgery, having failed outpatient conservative management. Prophylactic antibiotics were started before the procedure and continued for 24 hours. They were admitted after surgery to the orthopedic floor.   There were no complications during the hospital stay. All home medications were continued.     Routine consults were obtained from Physical Therapy for twice daily PT, and from the Hospitalist for Medical Co-management. Patient was placed on anticoagulation.  Pertinent home medications were continued.  Daily labs were followed.      POD 0 pt was stable overnight.  No Major issues post op. Pt received PT twice daily. The plan is for for DC to home with home PT. The orthopedic Attending is aware and agrees.      The patient had no post-operative complications and clinically progressed faster than anticipated. The following condition(s) were actively treated during the hospital stay:  HTN  The patient met criteria for discharge before the 2nd night of the stay. The patient was appropriately and safely discharged home with home PT.

## 2024-01-11 NOTE — PATIENT PROFILE ADULT - DO YOU LACK THE NECESSARY SUPPORT TO HELP YOU COPE WITH LIFE CHALLENGES?
PORTABLE CHEST 1V

 

Clinical Indication: Reason: SOB / Spl. Instructions:  / History: 

 

Comparison:  AP chest, 2 days ago.

 

Findings: 

Tracheostomy is in stable position. There is right PICC, tip in upper 

right atrium. 

The cardiomediastinal silhouette is stable. There is no pneumothorax. 

Small bilateral pleural effusions and mild bibasilar airspace disease are 

not significantly changed. No acute bone abnormality.

 

IMPRESSION:

1.  Stable life support devices.

2.  Small bilateral pleural effusions and mild bibasilar airspace disease 

are unchanged.

 

Electronically signed by: Joshua Amaya MD (8/11/2020 1:39 PM) [id number] no

## 2024-01-11 NOTE — PROGRESS NOTE ADULT - SUBJECTIVE AND OBJECTIVE BOX
Orthopedics Post-op Check  POD 0  Patient seen and examined at bedside. Pain is controlled. Patient is feeling well and denies any CP, SOB, nausea or vomiting.      LABS:                        12.3   11.73 )-----------( 222      ( 11 Jan 2024 12:23 )             37.6     01-11    136  |  106  |  24<H>  ----------------------------<  97  4.5   |  28  |  1.00    Ca    8.6      11 Jan 2024 12:23        Urinalysis Basic - ( 11 Jan 2024 12:23 )    Color: x / Appearance: x / SG: x / pH: x  Gluc: 97 mg/dL / Ketone: x  / Bili: x / Urobili: x   Blood: x / Protein: x / Nitrite: x   Leuk Esterase: x / RBC: x / WBC x   Sq Epi: x / Non Sq Epi: x / Bacteria: x      VITAL SIGNS:  T(C): 36.6 (01-11-24 @ 11:32), Max: 36.8 (01-11-24 @ 07:00)  HR: 55 (01-11-24 @ 13:45) (54 - 68)  BP: 125/63 (01-11-24 @ 13:45) (87/62 - 136/83)  RR: 16 (01-11-24 @ 13:45) (12 - 18)  SpO2: 100% (01-11-24 @ 13:45) (97% - 100%)    PE:   Gen: NAD, resting comfortably  LLE:   Dressing c/d/i  (-) EHL/FHL/TA/GS  (-) SILT L2-S1  Compartments soft and compressible  DP/PT pulses palpable  No calf TTP bilaterally    A/P:  63yMale Stable POD 0  s/p L CHARI    Motor and sensory deficits likely residual to anesthetic block. Will continue to monitor for improvement.   Follow up postoperative labs  WBAT, PT/OT, Posterior Precautions  Pain management PRN  Continue PPx antibiotics x24 hrs.   DVT PPx: hold until POD1.   Incentive spirometry  Dispo: Pending PT Eval.

## 2024-01-11 NOTE — PHYSICAL THERAPY INITIAL EVALUATION ADULT - PERTINENT HX OF CURRENT PROBLEM, REHAB EVAL
as per PST H&P: 63 y.o WD, WN male presents to PST with hx of left hip pain, advanced arthritis in the left hip. Patient's left hip pain is impacting his life activities. His hx is significant for HTN and Hyperlipidemia. He is scheduled for a Left Total Hip Replacement

## 2024-01-11 NOTE — DISCHARGE NOTE PROVIDER - NSDCFUSCHEDAPPT_GEN_ALL_CORE_FT
KAYLAN QUINN  [facility] Physician [facility]  [address]  Scheduled Appointment: 01/11/2024    [facility]  [address] [facility]  Scheduled Appointment: 02/12/2024     KAYLAN QUINN  [facility] Physician [facility]  [address]  Scheduled Appointment: 01/11/2024    [facility]  [address] [facility]  Scheduled Appointment: 02/12/2024     [facility] Physician [facility]  [address] [facility]  Scheduled Appointment: 02/12/2024     [facility] Physician [facility]  [address] [facility]  Scheduled Appointment: 02/12/2024

## 2024-01-11 NOTE — PATIENT PROFILE ADULT - FALL HARM RISK - UNIVERSAL INTERVENTIONS
Bed in lowest position, wheels locked, appropriate side rails in place/Call bell, personal items and telephone in reach/Instruct patient to call for assistance before getting out of bed or chair/Non-slip footwear when patient is out of bed/Busy to call system/Physically safe environment - no spills, clutter or unnecessary equipment/Purposeful Proactive Rounding/Room/bathroom lighting operational, light cord in reach Bed in lowest position, wheels locked, appropriate side rails in place/Call bell, personal items and telephone in reach/Instruct patient to call for assistance before getting out of bed or chair/Non-slip footwear when patient is out of bed/[place] to call system/Physically safe environment - no spills, clutter or unnecessary equipment/Purposeful Proactive Rounding/Room/bathroom lighting operational, light cord in reach

## 2024-01-11 NOTE — BRIEF OPERATIVE NOTE - NSICDXBRIEFPREOP_GEN_ALL_CORE_FT
PRE-OP DIAGNOSIS:  Osteoarthritis of left hip 11-Jan-2024 11:47:15  Sergey Csatillo   PRE-OP DIAGNOSIS:  Osteoarthritis of left hip 11-Jan-2024 11:47:15  Sergey Castillo

## 2024-01-11 NOTE — DISCHARGE NOTE PROVIDER - NSDCFUADDINST_GEN_ALL_CORE_FT
Discharge Instructions for Left Total Hip Arthroplasty:    1. ACTIVITY: WBAT. Rolling walker. Posterior Hip Dislocation Precautions. Abduction Pillow while in bed for 6 weeks. Daily PT.  2. CALL FOR: fever over 101, wound redness, drainage or open area, calf pain/calf swelling.  3. BANDAGE: Change dressing to a new Mepilex Ag bandage POD7 (1/18). May change sooner if dressing saturated or falling off. DO NOT REMOVE BANDAGE TO CHECK WOUND ON INTAKE.  4. STAPLES: RN Remove Staples POD14 (1/25).  5. SHOWER: Okay to shower. Do not soak, submerge or let shower stream beat on dressing/wound.  6. DVT PE Prophylaxis: Aspirin 81mg PO BID x 28 days. See Med Rec.  7. GI: Continue Protonix daily while on Anticoagulant. eRx has been sent to your pharmacy.  8. FOLLOW UP: Dr. Jacinto in 1 month. Call office to schedule.  9. MEDICATION: eRX sent to your pharmacy for  if you go home.   10. **Call office if medications not covered under your insurance, especially BLOOD CLOT PREVENTION/anticoagulant medication.

## 2024-01-11 NOTE — PHYSICAL THERAPY INITIAL EVALUATION ADULT - IMPAIRMENTS CONTRIBUTING TO GAIT DEVIATIONS, PT EVAL
Faxed order and demographics to [facility]. Fax confirmation recd.    impaired coordination/decreased flexibility/impaired postural control

## 2024-01-11 NOTE — DISCHARGE NOTE PROVIDER - NSDCCPGOAL_GEN_ALL_CORE_FT
EGD Procedure Report    Herb Finley  [id number]  1958      Procedure:   EGD     Date:  8/15/2023. Endoscopist: Greg Ortiz MD.    Referring Provider:  Mónica Cota Mounika Kirkpatrick To get better and follow your care plan as instructed.

## 2024-01-11 NOTE — PHYSICAL THERAPY INITIAL EVALUATION ADULT - GAIT DISTANCE, PT EVAL
side stepping along side of the bed and marching in place. Gait distance limited by the length of the PACU lines and monitors and the pt's c/o feeling dizzy during initial standing./10 feet

## 2024-01-11 NOTE — PHYSICAL THERAPY INITIAL EVALUATION ADULT - MODALITIES TREATMENT COMMENTS
The pt demonstrated good overall activity tolerance, responding well to therex review, transfer and ambulation tx. The pt was returned to supine and adjusted for comfort in bed/ stretcher, all pacu lines and monitors in place. The pt was in NAD at end of tx.

## 2024-01-11 NOTE — CONSULT NOTE ADULT - SUBJECTIVE AND OBJECTIVE BOX
PCP: dr. miriam sacnhez     CHIEF COMPLAINT: left hip pain     HISTORY OF THE PRESENT ILLNESS: this is a 64 yo with PMH of HLD, HTN, anxiety, spinal stenosis and herniated disc and OA  of the left hip with progressive pain with ambulation, climbing stairs which is impacting his quality of life, pt has failed the usual modalities for pain and is now s/p left total hip replacement.  Pt is seen in PACU, IN NAD, denies any CP or SOB.  We are consulted for medical management      PAST MEDICAL & SURGICAL HISTORY:  Hypertension  High cholesterol  Arthritis  Anxiety  Lumbar herniated disc  Lumbar spinal stenosis  GERD (gastroesophageal reflux disease)        PAST SURGICAL HISTORY:   H/O fracture of arm  H/O cosmetic surgery  H/O colonoscopy          FAMILY HISTORY:   non-contributory to the patient's current presentation    SOCIAL HISTORY:  no smoking, 2 drinks a month of alcohol, no drugs    ALLERGIES: no known drug allergies     HOME MEDS:  Home Medications:  atorvastatin 10 mg oral tablet: 1 tab(s) orally once a day (in the evening) (11 Jan 2024 07:18)  CoQ10 supplement 3x a week:  (11 Jan 2024 07:18)  Fish Oil supplement 3x a week:  (11 Jan 2024 07:18)  irbesartan 300 mg oral tablet: 1 tab(s) orally once a day (in the evening) (11 Jan 2024 07:18)  meloxicam 15 mg oral tablet: 1 tab(s) orally once a day as needed for  moderate pain (11 Jan 2024 07:18)  Multiple Vitamins oral tablet: 1 tab(s) orally once a day (11 Jan 2024 07:18)  Saw [place] once daily:  (11 Jan 2024 07:18)  Vitamin D3 supplement daily:  (11 Jan 2024 07:18)  Zoloft 50 mg oral tablet: 1 tab(s) orally once a day (in the evening) (11 Jan 2024 07:18)      REVIEW OF SYSTEMS:   All 10 systems reviewed in detailed and found to be negative with the exception of what has already been described above    Vital Signs Last 24 Hrs  T(C): 36.6 (11 Jan 2024 11:32), Max: 36.8 (11 Jan 2024 07:00)  T(F): 97.8 (11 Jan 2024 11:32), Max: 98.3 (11 Jan 2024 07:00)  HR: 56 (11 Jan 2024 12:45) (54 - 68)  BP: 128/77 (11 Jan 2024 12:45) (87/62 - 136/83)  BP(mean): --  RR: 12 (11 Jan 2024 12:45) (12 - 18)  SpO2: 100% (11 Jan 2024 12:45) (97% - 100%)    Parameters below as of 11 Jan 2024 11:32  Patient On (Oxygen Delivery Method): room air        MEDICATIONS  (STANDING):  lactated ringers. 1000 milliLiter(s) (125 mL/Hr) IV Continuous <Continuous>  pantoprazole    Tablet 40 milliGRAM(s) Oral before breakfast  tranexamic acid IVPB 1000 milliGRAM(s) IV Intermittent once  tranexamic acid IVPB 1000 milliGRAM(s) IV Intermittent once    MEDICATIONS  (PRN):  acetaminophen   IVPB .. 1000 milliGRAM(s) IV Intermittent once PRN Mild Pain (1 - 3)  fentaNYL    Injectable 25 MICROGram(s) IV Push every 5 minutes PRN Moderate Pain (4 - 6)  ondansetron Injectable 4 milliGRAM(s) IV Push once PRN Nausea and/or Vomiting  oxyCODONE    IR 5 milliGRAM(s) Oral once PRN Moderate Pain (4 - 6)      HEENT:  pupils equal and reactive, EOMI, no oropharyngeal lesions, erythema, exudates, oral thrush  NECK:   supple, no carotid bruits, no palpable lymph nodes, no thyromegaly  CV:  +S1, +S2, regular, no murmurs or rubs  RESP:   lungs clear to auscultation bilaterally, no wheezing, rales, rhonchi, good air entry bilaterally  GI:  abdomen soft, non-tender, non-distended, normal BS, no bruits, no abdominal masses, no palpable masses  EXT:  no clubbing, no cyanosis, no edema, no calf pain, swelling or erythema  NEURO:  AAOX3, no focal neurological deficits, follows all commands, able to move extremities spontaneously  SKIN:  no ulcers, lesions or rashes    LABS:                          12.3   11.73 )-----------( 222      ( 11 Jan 2024 12:23 )             37.6     01-11    136  |  106  |  24<H>  ----------------------------<  97  4.5   |  28  |  1.00    Ca    8.6      11 Jan 2024 12:23              Urinalysis Basic - ( 11 Jan 2024 12:23 )    Color: x / Appearance: x / SG: x / pH: x  Gluc: 97 mg/dL / Ketone: x  / Bili: x / Urobili: x   Blood: x / Protein: x / Nitrite: x   Leuk Esterase: x / RBC: x / WBC x   Sq Epi: x / Non Sq Epi: x / Bacteria: x                IMPRESSION: 64 yo with above pmh a/w:  # OA of left hip   # S/P left hip replacement      POD#0  pain control  PT eval  Bowel regimen  IVF's  Finish ABXs  regular diet  PPI  VTE prophylaxis  monitor CBC/BMP      # HTN  pt at high risk for fluctuations in B/P 2/2 anesthesia, pain, hypovolemia and use of narcotics, placing pt at high risk for MI/CVA  monitor B/P closely  therapeutic interchange for irbesartan   adjust anti-htn's accordingly    # HLD  cont statin     # anxiety,   cont zoloft     # Vte prophylaxis  ASA 81 BID   Venodynes  INC mobility      Thank you for the consult, will follow         PCP: dr. miriam sanchez     CHIEF COMPLAINT: left hip pain     HISTORY OF THE PRESENT ILLNESS: this is a 62 yo with PMH of HLD, HTN, anxiety, spinal stenosis and herniated disc and OA  of the left hip with progressive pain with ambulation, climbing stairs which is impacting his quality of life, pt has failed the usual modalities for pain and is now s/p left total hip replacement.  Pt is seen in PACU, IN NAD, denies any CP or SOB.  We are consulted for medical management      PAST MEDICAL & SURGICAL HISTORY:  Hypertension  High cholesterol  Arthritis  Anxiety  Lumbar herniated disc  Lumbar spinal stenosis  GERD (gastroesophageal reflux disease)        PAST SURGICAL HISTORY:   H/O fracture of arm  H/O cosmetic surgery  H/O colonoscopy          FAMILY HISTORY:   non-contributory to the patient's current presentation    SOCIAL HISTORY:  no smoking, 2 drinks a month of alcohol, no drugs    ALLERGIES: no known drug allergies     HOME MEDS:  Home Medications:  atorvastatin 10 mg oral tablet: 1 tab(s) orally once a day (in the evening) (11 Jan 2024 07:18)  CoQ10 supplement 3x a week:  (11 Jan 2024 07:18)  Fish Oil supplement 3x a week:  (11 Jan 2024 07:18)  irbesartan 300 mg oral tablet: 1 tab(s) orally once a day (in the evening) (11 Jan 2024 07:18)  meloxicam 15 mg oral tablet: 1 tab(s) orally once a day as needed for  moderate pain (11 Jan 2024 07:18)  Multiple Vitamins oral tablet: 1 tab(s) orally once a day (11 Jan 2024 07:18)  Saw [place] once daily:  (11 Jan 2024 07:18)  Vitamin D3 supplement daily:  (11 Jan 2024 07:18)  Zoloft 50 mg oral tablet: 1 tab(s) orally once a day (in the evening) (11 Jan 2024 07:18)      REVIEW OF SYSTEMS:   All 10 systems reviewed in detailed and found to be negative with the exception of what has already been described above    Vital Signs Last 24 Hrs  T(C): 36.6 (11 Jan 2024 11:32), Max: 36.8 (11 Jan 2024 07:00)  T(F): 97.8 (11 Jan 2024 11:32), Max: 98.3 (11 Jan 2024 07:00)  HR: 56 (11 Jan 2024 12:45) (54 - 68)  BP: 128/77 (11 Jan 2024 12:45) (87/62 - 136/83)  BP(mean): --  RR: 12 (11 Jan 2024 12:45) (12 - 18)  SpO2: 100% (11 Jan 2024 12:45) (97% - 100%)    Parameters below as of 11 Jan 2024 11:32  Patient On (Oxygen Delivery Method): room air        MEDICATIONS  (STANDING):  lactated ringers. 1000 milliLiter(s) (125 mL/Hr) IV Continuous <Continuous>  pantoprazole    Tablet 40 milliGRAM(s) Oral before breakfast  tranexamic acid IVPB 1000 milliGRAM(s) IV Intermittent once  tranexamic acid IVPB 1000 milliGRAM(s) IV Intermittent once    MEDICATIONS  (PRN):  acetaminophen   IVPB .. 1000 milliGRAM(s) IV Intermittent once PRN Mild Pain (1 - 3)  fentaNYL    Injectable 25 MICROGram(s) IV Push every 5 minutes PRN Moderate Pain (4 - 6)  ondansetron Injectable 4 milliGRAM(s) IV Push once PRN Nausea and/or Vomiting  oxyCODONE    IR 5 milliGRAM(s) Oral once PRN Moderate Pain (4 - 6)      HEENT:  pupils equal and reactive, EOMI, no oropharyngeal lesions, erythema, exudates, oral thrush  NECK:   supple, no carotid bruits, no palpable lymph nodes, no thyromegaly  CV:  +S1, +S2, regular, no murmurs or rubs  RESP:   lungs clear to auscultation bilaterally, no wheezing, rales, rhonchi, good air entry bilaterally  GI:  abdomen soft, non-tender, non-distended, normal BS, no bruits, no abdominal masses, no palpable masses  EXT:  no clubbing, no cyanosis, no edema, no calf pain, swelling or erythema  NEURO:  AAOX3, no focal neurological deficits, follows all commands, able to move extremities spontaneously  SKIN:  no ulcers, lesions or rashes    LABS:                          12.3   11.73 )-----------( 222      ( 11 Jan 2024 12:23 )             37.6     01-11    136  |  106  |  24<H>  ----------------------------<  97  4.5   |  28  |  1.00    Ca    8.6      11 Jan 2024 12:23              Urinalysis Basic - ( 11 Jan 2024 12:23 )    Color: x / Appearance: x / SG: x / pH: x  Gluc: 97 mg/dL / Ketone: x  / Bili: x / Urobili: x   Blood: x / Protein: x / Nitrite: x   Leuk Esterase: x / RBC: x / WBC x   Sq Epi: x / Non Sq Epi: x / Bacteria: x                IMPRESSION: 62 yo with above pmh a/w:  # OA of left hip   # S/P left hip replacement      POD#0  pain control  PT eval  Bowel regimen  IVF's  Finish ABXs  regular diet  PPI  VTE prophylaxis  monitor CBC/BMP      # HTN  pt at high risk for fluctuations in B/P 2/2 anesthesia, pain, hypovolemia and use of narcotics, placing pt at high risk for MI/CVA  monitor B/P closely  therapeutic interchange for irbesartan   adjust anti-htn's accordingly    # HLD  cont statin     # anxiety,   cont zoloft     # Vte prophylaxis  ASA 81 BID   Venodynes  INC mobility      Thank you for the consult, will follow

## 2024-01-11 NOTE — DISCHARGE NOTE PROVIDER - NSDCMRMEDTOKEN_GEN_ALL_CORE_FT
aspirin 81 mg oral delayed release tablet: 1 tab(s) orally 2 times a day  atorvastatin 10 mg oral tablet: 1 tab(s) orally once a day (in the evening)  CoQ10 supplement 3x a week:   Fish Oil supplement 3x a week:   irbesartan 300 mg oral tablet: 1 tab(s) orally once a day (in the evening)  meloxicam 15 mg oral tablet: 1 tab(s) orally once a day as needed for  moderate pain  MiraLax oral powder for reconstitution: 17 gram(s) orally once a day as needed for  constipation  Multiple Vitamins oral tablet: 1 tab(s) orally once a day  oxyCODONE 5 mg oral tablet: 1 tab(s) orally every 6 hours MDD: 6 tablets  Protonix 40 mg oral delayed release tablet: 1 tab(s) orally once a day  Saw [place] once daily:   Senna 8.6 mg oral tablet: 1 tab(s) orally once a day as needed for  constipation  Tylenol Extra Strength 500 mg oral tablet: 2 tab(s) orally 3 times a day  Vitamin D3 supplement daily:   Zoloft 50 mg oral tablet: 1 tab(s) orally once a day (in the evening)   aspirin 81 mg oral delayed release tablet: 1 tab(s) orally 2 times a day  atorvastatin 10 mg oral tablet: 1 tab(s) orally once a day (in the evening)  CoQ10 supplement 3x a week:   Fish Oil supplement 3x a week:   irbesartan 300 mg oral tablet: 1 tab(s) orally once a day (in the evening)  meloxicam 15 mg oral tablet: 1 tab(s) orally once a day as needed for  moderate pain  MiraLax oral powder for reconstitution: 17 gram(s) orally once a day as needed for  constipation  Multiple Vitamins oral tablet: 1 tab(s) orally once a day  oxyCODONE 5 mg oral tablet: 1 tab(s) orally every 6 hours MDD: 6 tablets  Protonix 40 mg oral delayed release tablet: 1 tab(s) orally once a day  Saw [place] once daily:   Senna 8.6 mg oral tablet: 1 tab(s) orally once a day as needed for  constipation  Tylenol Extra Strength 500 mg oral tablet: 2 tab(s) orally 3 times a day  Vitamin D3 supplement daily:   Zoloft 50 mg oral tablet: 1 tab(s) orally once a day (in the evening)   aspirin 81 mg oral delayed release tablet: 1 tab(s) orally 2 times a day  atorvastatin 10 mg oral tablet: 1 tab(s) orally once a day (in the evening)  CoQ10 supplement 3x a week:   irbesartan 300 mg oral tablet: 1 tab(s) orally once a day (in the evening)  MiraLax oral powder for reconstitution: 17 gram(s) orally once a day as needed for  constipation  Multiple Vitamins oral tablet: 1 tab(s) orally once a day  oxyCODONE 5 mg oral tablet: 1 tab(s) orally every 6 hours MDD: 6 tablets  Protonix 40 mg oral delayed release tablet: 1 tab(s) orally once a day  Saw [place] once daily:   Senna 8.6 mg oral tablet: 1 tab(s) orally once a day as needed for  constipation  Tylenol Extra Strength 500 mg oral tablet: 2 tab(s) orally 3 times a day  Vitamin D3 supplement daily:   Zoloft 50 mg oral tablet: 1 tab(s) orally once a day (in the evening)   aspirin 81 mg oral delayed release tablet: 1 tab(s) orally 2 times a day  atorvastatin 10 mg oral tablet: 1 tab(s) orally once a day (in the evening)  CoQ10 supplement 3x a week:   irbesartan 300 mg oral tablet: 1 tab(s) orally once a day (in the evening)  MiraLax oral powder for reconstitution: 17 gram(s) orally once a day as needed for  constipation  Multiple Vitamins oral tablet: 1 tab(s) orally once a day  oxyCODONE 5 mg oral tablet: 1 tab(s) orally every 6 hours MDD: 6 tablets  Protonix 40 mg oral delayed release tablet: 1 tab(s) orally once a day  Saw [place] once daily:   Senna 8.6 mg oral tablet: 1 tab(s) orally once a day as needed for  constipation  Tylenol Extra Strength 500 mg oral tablet: 2 tab(s) orally 3 times a day  Vitamin D3 supplement daily:   Zoloft 50 mg oral tablet: 1 tab(s) orally once a day (in the evening)   aspirin 81 mg oral delayed release tablet: 1 tab(s) orally 2 times a day  atorvastatin 10 mg oral tablet: 1 tab(s) orally once a day (in the evening)  CoQ10 supplement 3x a week:   irbesartan 300 mg oral tablet: 1 tab(s) orally once a day (in the evening)  MiraLax oral powder for reconstitution: 17 gram(s) orally once a day as needed for  constipation  Multiple Vitamins oral tablet: 1 tab(s) orally once a day  ondansetron 4 mg oral tablet: 1 tab(s) orally every 8 hours as needed for  nausea  oxyCODONE 5 mg oral tablet: 1 tab(s) orally every 6 hours MDD: 6 tablets  Protonix 40 mg oral delayed release tablet: 1 tab(s) orally once a day  Saw [place] once daily:   Senna 8.6 mg oral tablet: 1 tab(s) orally once a day as needed for  constipation  Tylenol Extra Strength 500 mg oral tablet: 2 tab(s) orally 3 times a day  Vitamin D3 supplement daily:   Zoloft 50 mg oral tablet: 1 tab(s) orally once a day (in the evening)   aspirin 81 mg oral delayed release tablet: 1 tab(s) orally 2 times a day  atorvastatin 10 mg oral tablet: 1 tab(s) orally once a day (in the evening)  CoQ10 supplement 3x a week:   irbesartan 300 mg oral tablet: 1 tab(s) orally once a day (in the evening)  MiraLax oral powder for reconstitution: 17 gram(s) orally once a day as needed for  constipation  Multiple Vitamins oral tablet: 1 tab(s) orally once a day  ondansetron 4 mg oral tablet: 1 tab(s) orally every 8 hours as needed for  nausea  oxyCODONE 5 mg oral tablet: 1 tab(s) orally every 6 hours MDD: 6 tablets  Protonix 40 mg oral delayed release tablet: 1 tab(s) orally once a day  Saw [place] once daily:   Senna 8.6 mg oral tablet: 1 tab(s) orally once a day as needed for  constipation  Tylenol Extra Strength 500 mg oral tablet: 2 tab(s) orally 3 times a day  Vitamin D3 supplement daily:   Zoloft 50 mg oral tablet: 1 tab(s) orally once a day (in the evening)

## 2024-01-11 NOTE — DISCHARGE NOTE PROVIDER - NSDCCPCAREPLAN_GEN_ALL_CORE_FT
PRINCIPAL DISCHARGE DIAGNOSIS  Diagnosis: Arthritis of left hip  Assessment and Plan of Treatment:

## 2024-01-11 NOTE — CONSULT NOTE ADULT - NS ATTEND AMEND GEN_ALL_CORE FT
Patient seen and examined with SHELLEY Torrez.  I was physically present for the key portions of the evaluation and management (E/M) service provided.  I agree with the above history, physical, and plan which I have reviewed and edited where appropriate.   - plan as per ortho  - pain meds prn  - incentive spirometer

## 2024-01-12 ENCOUNTER — TRANSCRIPTION ENCOUNTER (OUTPATIENT)
Age: 64
End: 2024-01-12

## 2024-01-12 VITALS
RESPIRATION RATE: 18 BRPM | HEART RATE: 67 BPM | DIASTOLIC BLOOD PRESSURE: 74 MMHG | SYSTOLIC BLOOD PRESSURE: 112 MMHG | TEMPERATURE: 98 F | OXYGEN SATURATION: 98 %

## 2024-01-12 LAB
ANION GAP SERPL CALC-SCNC: 4 MMOL/L — LOW (ref 5–17)
ANION GAP SERPL CALC-SCNC: 4 MMOL/L — LOW (ref 5–17)
BUN SERPL-MCNC: 23 MG/DL — SIGNIFICANT CHANGE UP (ref 7–23)
BUN SERPL-MCNC: 23 MG/DL — SIGNIFICANT CHANGE UP (ref 7–23)
CALCIUM SERPL-MCNC: 8.7 MG/DL — SIGNIFICANT CHANGE UP (ref 8.5–10.1)
CALCIUM SERPL-MCNC: 8.7 MG/DL — SIGNIFICANT CHANGE UP (ref 8.5–10.1)
CHLORIDE SERPL-SCNC: 106 MMOL/L — SIGNIFICANT CHANGE UP (ref 96–108)
CHLORIDE SERPL-SCNC: 106 MMOL/L — SIGNIFICANT CHANGE UP (ref 96–108)
CO2 SERPL-SCNC: 26 MMOL/L — SIGNIFICANT CHANGE UP (ref 22–31)
CO2 SERPL-SCNC: 26 MMOL/L — SIGNIFICANT CHANGE UP (ref 22–31)
CREAT SERPL-MCNC: 0.86 MG/DL — SIGNIFICANT CHANGE UP (ref 0.5–1.3)
CREAT SERPL-MCNC: 0.86 MG/DL — SIGNIFICANT CHANGE UP (ref 0.5–1.3)
EGFR: 98 ML/MIN/1.73M2 — SIGNIFICANT CHANGE UP
EGFR: 98 ML/MIN/1.73M2 — SIGNIFICANT CHANGE UP
GLUCOSE SERPL-MCNC: 115 MG/DL — HIGH (ref 70–99)
GLUCOSE SERPL-MCNC: 115 MG/DL — HIGH (ref 70–99)
HCT VFR BLD CALC: 34.7 % — LOW (ref 39–50)
HCT VFR BLD CALC: 34.7 % — LOW (ref 39–50)
HGB BLD-MCNC: 11.5 G/DL — LOW (ref 13–17)
HGB BLD-MCNC: 11.5 G/DL — LOW (ref 13–17)
MCHC RBC-ENTMCNC: 29.4 PG — SIGNIFICANT CHANGE UP (ref 27–34)
MCHC RBC-ENTMCNC: 29.4 PG — SIGNIFICANT CHANGE UP (ref 27–34)
MCHC RBC-ENTMCNC: 33.1 GM/DL — SIGNIFICANT CHANGE UP (ref 32–36)
MCHC RBC-ENTMCNC: 33.1 GM/DL — SIGNIFICANT CHANGE UP (ref 32–36)
MCV RBC AUTO: 88.7 FL — SIGNIFICANT CHANGE UP (ref 80–100)
MCV RBC AUTO: 88.7 FL — SIGNIFICANT CHANGE UP (ref 80–100)
PLATELET # BLD AUTO: 216 K/UL — SIGNIFICANT CHANGE UP (ref 150–400)
PLATELET # BLD AUTO: 216 K/UL — SIGNIFICANT CHANGE UP (ref 150–400)
POTASSIUM SERPL-MCNC: 3.8 MMOL/L — SIGNIFICANT CHANGE UP (ref 3.5–5.3)
POTASSIUM SERPL-MCNC: 3.8 MMOL/L — SIGNIFICANT CHANGE UP (ref 3.5–5.3)
POTASSIUM SERPL-SCNC: 3.8 MMOL/L — SIGNIFICANT CHANGE UP (ref 3.5–5.3)
POTASSIUM SERPL-SCNC: 3.8 MMOL/L — SIGNIFICANT CHANGE UP (ref 3.5–5.3)
RBC # BLD: 3.91 M/UL — LOW (ref 4.2–5.8)
RBC # BLD: 3.91 M/UL — LOW (ref 4.2–5.8)
RBC # FLD: 12.3 % — SIGNIFICANT CHANGE UP (ref 10.3–14.5)
RBC # FLD: 12.3 % — SIGNIFICANT CHANGE UP (ref 10.3–14.5)
SODIUM SERPL-SCNC: 136 MMOL/L — SIGNIFICANT CHANGE UP (ref 135–145)
SODIUM SERPL-SCNC: 136 MMOL/L — SIGNIFICANT CHANGE UP (ref 135–145)
WBC # BLD: 15.21 K/UL — HIGH (ref 3.8–10.5)
WBC # BLD: 15.21 K/UL — HIGH (ref 3.8–10.5)
WBC # FLD AUTO: 15.21 K/UL — HIGH (ref 3.8–10.5)
WBC # FLD AUTO: 15.21 K/UL — HIGH (ref 3.8–10.5)

## 2024-01-12 PROCEDURE — 99232 SBSQ HOSP IP/OBS MODERATE 35: CPT

## 2024-01-12 RX ORDER — MELOXICAM 15 MG/1
1 TABLET ORAL
Refills: 0 | DISCHARGE

## 2024-01-12 RX ORDER — ONDANSETRON 8 MG/1
1 TABLET, FILM COATED ORAL
Qty: 21 | Refills: 0
Start: 2024-01-12 | End: 2024-01-18

## 2024-01-12 RX ADMIN — Medication 1000 MILLIGRAM(S): at 05:51

## 2024-01-12 RX ADMIN — Medication 2000 MILLIGRAM(S): at 01:20

## 2024-01-12 RX ADMIN — LOSARTAN POTASSIUM 100 MILLIGRAM(S): 100 TABLET, FILM COATED ORAL at 11:54

## 2024-01-12 RX ADMIN — PANTOPRAZOLE SODIUM 40 MILLIGRAM(S): 20 TABLET, DELAYED RELEASE ORAL at 05:51

## 2024-01-12 RX ADMIN — CELECOXIB 200 MILLIGRAM(S): 200 CAPSULE ORAL at 11:54

## 2024-01-12 RX ADMIN — Medication 101.6 MILLIGRAM(S): at 05:51

## 2024-01-12 RX ADMIN — Medication 1 TABLET(S): at 05:52

## 2024-01-12 RX ADMIN — Medication 1 TABLET(S): at 14:37

## 2024-01-12 RX ADMIN — SERTRALINE 50 MILLIGRAM(S): 25 TABLET, FILM COATED ORAL at 11:53

## 2024-01-12 RX ADMIN — CELECOXIB 200 MILLIGRAM(S): 200 CAPSULE ORAL at 12:14

## 2024-01-12 RX ADMIN — Medication 1 TABLET(S): at 11:59

## 2024-01-12 RX ADMIN — OXYCODONE HYDROCHLORIDE 10 MILLIGRAM(S): 5 TABLET ORAL at 01:20

## 2024-01-12 RX ADMIN — OXYCODONE HYDROCHLORIDE 10 MILLIGRAM(S): 5 TABLET ORAL at 01:50

## 2024-01-12 RX ADMIN — Medication 1000 MILLIGRAM(S): at 15:12

## 2024-01-12 RX ADMIN — ONDANSETRON 4 MILLIGRAM(S): 8 TABLET, FILM COATED ORAL at 01:22

## 2024-01-12 RX ADMIN — Medication 1000 MILLIGRAM(S): at 14:37

## 2024-01-12 RX ADMIN — Medication 81 MILLIGRAM(S): at 11:53

## 2024-01-12 NOTE — DISCHARGE NOTE NURSING/CASE MANAGEMENT/SOCIAL WORK - NSDCCRNAME_GEN_ALL_CORE_FT
[facility] - [phone] [facility] - [phone]

## 2024-01-12 NOTE — DISCHARGE NOTE NURSING/CASE MANAGEMENT/SOCIAL WORK - NSDCPEFALRISK_GEN_ALL_CORE
For information on Fall & Injury Prevention, visit: [email or web address] OR  [email or web address] OR  https://www.cdc.gov/steadi/patient.html For information on Fall & Injury Prevention, visit: [email or web address] OR  [email or web address] OR  https://www.cdc.gov/steadi/patient.html

## 2024-01-12 NOTE — PROGRESS NOTE ADULT - SUBJECTIVE AND OBJECTIVE BOX
PCP: dr. miriam sanchez     CHIEF COMPLAINT: left hip pain     HISTORY OF THE PRESENT ILLNESS: this is a 62 yo with PMH of HLD, HTN, anxiety, spinal stenosis and herniated disc and OA  of the left hip with progressive pain with ambulation, climbing stairs which is impacting his quality of life, pt has failed the usual modalities for pain and is now s/p left total hip replacement.  Pt is seen in PACU, IN NAD, denies any CP or SOB.  We are consulted for medical management      PAST MEDICAL & SURGICAL HISTORY:  Hypertension  High cholesterol  Arthritis  Anxiety  Lumbar herniated disc  Lumbar spinal stenosis  GERD (gastroesophageal reflux disease)        PAST SURGICAL HISTORY:   H/O fracture of arm  H/O cosmetic surgery  H/O colonoscopy          FAMILY HISTORY:   non-contributory to the patient's current presentation    SOCIAL HISTORY:  no smoking, 2 drinks a month of alcohol, no drugs    ALLERGIES: no known drug allergies     HOME MEDS:  Home Medications:  atorvastatin 10 mg oral tablet: 1 tab(s) orally once a day (in the evening) (11 Jan 2024 07:18)  CoQ10 supplement 3x a week:  (11 Jan 2024 07:18)  Fish Oil supplement 3x a week:  (11 Jan 2024 07:18)  irbesartan 300 mg oral tablet: 1 tab(s) orally once a day (in the evening) (11 Jan 2024 07:18)  meloxicam 15 mg oral tablet: 1 tab(s) orally once a day as needed for  moderate pain (11 Jan 2024 07:18)  Multiple Vitamins oral tablet: 1 tab(s) orally once a day (11 Jan 2024 07:18)  Saw [place] once daily:  (11 Jan 2024 07:18)  Vitamin D3 supplement daily:  (11 Jan 2024 07:18)  Zoloft 50 mg oral tablet: 1 tab(s) orally once a day (in the evening) (11 Jan 2024 07:18)      REVIEW OF SYSTEMS:   All 10 systems reviewed in detailed and found to be negative with the exception of what has already been described above    Vital Signs Last 24 Hrs  T(C): 36.6 (11 Jan 2024 11:32), Max: 36.8 (11 Jan 2024 07:00)  T(F): 97.8 (11 Jan 2024 11:32), Max: 98.3 (11 Jan 2024 07:00)  HR: 56 (11 Jan 2024 12:45) (54 - 68)  BP: 128/77 (11 Jan 2024 12:45) (87/62 - 136/83)  BP(mean): --  RR: 12 (11 Jan 2024 12:45) (12 - 18)  SpO2: 100% (11 Jan 2024 12:45) (97% - 100%)    Parameters below as of 11 Jan 2024 11:32  Patient On (Oxygen Delivery Method): room air        MEDICATIONS  (STANDING):  lactated ringers. 1000 milliLiter(s) (125 mL/Hr) IV Continuous <Continuous>  pantoprazole    Tablet 40 milliGRAM(s) Oral before breakfast  tranexamic acid IVPB 1000 milliGRAM(s) IV Intermittent once  tranexamic acid IVPB 1000 milliGRAM(s) IV Intermittent once    MEDICATIONS  (PRN):  acetaminophen   IVPB .. 1000 milliGRAM(s) IV Intermittent once PRN Mild Pain (1 - 3)  fentaNYL    Injectable 25 MICROGram(s) IV Push every 5 minutes PRN Moderate Pain (4 - 6)  ondansetron Injectable 4 milliGRAM(s) IV Push once PRN Nausea and/or Vomiting  oxyCODONE    IR 5 milliGRAM(s) Oral once PRN Moderate Pain (4 - 6)      HEENT:  pupils equal and reactive, EOMI, no oropharyngeal lesions, erythema, exudates, oral thrush  NECK:   supple, no carotid bruits, no palpable lymph nodes, no thyromegaly  CV:  +S1, +S2, regular, no murmurs or rubs  RESP:   lungs clear to auscultation bilaterally, no wheezing, rales, rhonchi, good air entry bilaterally  GI:  abdomen soft, non-tender, non-distended, normal BS, no bruits, no abdominal masses, no palpable masses  EXT:  no clubbing, no cyanosis, no edema, no calf pain, swelling or erythema  NEURO:  AAOX3, no focal neurological deficits, follows all commands, able to move extremities spontaneously  SKIN:  no ulcers, lesions or rashes    LABS:                          12.3   11.73 )-----------( 222      ( 11 Jan 2024 12:23 )             37.6     01-11    136  |  106  |  24<H>  ----------------------------<  97  4.5   |  28  |  1.00    Ca    8.6      11 Jan 2024 12:23              Urinalysis Basic - ( 11 Jan 2024 12:23 )    Color: x / Appearance: x / SG: x / pH: x  Gluc: 97 mg/dL / Ketone: x  / Bili: x / Urobili: x   Blood: x / Protein: x / Nitrite: x   Leuk Esterase: x / RBC: x / WBC x   Sq Epi: x / Non Sq Epi: x / Bacteria: x                IMPRESSION: 62 yo with above pmh a/w:  # OA of left hip   # S/P left hip replacement      POD#1  - pain well controlled  - participated with PT   - plan for dc home  VTE prophylaxis  monitor CBC/BMP      # HTN  pt at high risk for fluctuations in B/P 2/2 anesthesia, pain, hypovolemia and use of narcotics, placing pt at high risk for MI/CVA  monitor B/P closely  therapeutic interchange for irbesartan   adjust anti-htn's accordingly    # HLD  cont statin     # anxiety,   cont zoloft     # Vte prophylaxis  ASA 81 BID   Venodynes  INC mobility      Thank you for the consult, will follow       PCP: dr. miriam sanchez     CHIEF COMPLAINT: left hip pain     HISTORY OF THE PRESENT ILLNESS: this is a 64 yo with PMH of HLD, HTN, anxiety, spinal stenosis and herniated disc and OA  of the left hip with progressive pain with ambulation, climbing stairs which is impacting his quality of life, pt has failed the usual modalities for pain and is now s/p left total hip replacement.  Pt is seen in PACU, IN NAD, denies any CP or SOB.  We are consulted for medical management      PAST MEDICAL & SURGICAL HISTORY:  Hypertension  High cholesterol  Arthritis  Anxiety  Lumbar herniated disc  Lumbar spinal stenosis  GERD (gastroesophageal reflux disease)        PAST SURGICAL HISTORY:   H/O fracture of arm  H/O cosmetic surgery  H/O colonoscopy          FAMILY HISTORY:   non-contributory to the patient's current presentation    SOCIAL HISTORY:  no smoking, 2 drinks a month of alcohol, no drugs    ALLERGIES: no known drug allergies     HOME MEDS:  Home Medications:  atorvastatin 10 mg oral tablet: 1 tab(s) orally once a day (in the evening) (11 Jan 2024 07:18)  CoQ10 supplement 3x a week:  (11 Jan 2024 07:18)  Fish Oil supplement 3x a week:  (11 Jan 2024 07:18)  irbesartan 300 mg oral tablet: 1 tab(s) orally once a day (in the evening) (11 Jan 2024 07:18)  meloxicam 15 mg oral tablet: 1 tab(s) orally once a day as needed for  moderate pain (11 Jan 2024 07:18)  Multiple Vitamins oral tablet: 1 tab(s) orally once a day (11 Jan 2024 07:18)  Saw [place] once daily:  (11 Jan 2024 07:18)  Vitamin D3 supplement daily:  (11 Jan 2024 07:18)  Zoloft 50 mg oral tablet: 1 tab(s) orally once a day (in the evening) (11 Jan 2024 07:18)      REVIEW OF SYSTEMS:   All 10 systems reviewed in detailed and found to be negative with the exception of what has already been described above    Vital Signs Last 24 Hrs  T(C): 36.6 (11 Jan 2024 11:32), Max: 36.8 (11 Jan 2024 07:00)  T(F): 97.8 (11 Jan 2024 11:32), Max: 98.3 (11 Jan 2024 07:00)  HR: 56 (11 Jan 2024 12:45) (54 - 68)  BP: 128/77 (11 Jan 2024 12:45) (87/62 - 136/83)  BP(mean): --  RR: 12 (11 Jan 2024 12:45) (12 - 18)  SpO2: 100% (11 Jan 2024 12:45) (97% - 100%)    Parameters below as of 11 Jan 2024 11:32  Patient On (Oxygen Delivery Method): room air        MEDICATIONS  (STANDING):  lactated ringers. 1000 milliLiter(s) (125 mL/Hr) IV Continuous <Continuous>  pantoprazole    Tablet 40 milliGRAM(s) Oral before breakfast  tranexamic acid IVPB 1000 milliGRAM(s) IV Intermittent once  tranexamic acid IVPB 1000 milliGRAM(s) IV Intermittent once    MEDICATIONS  (PRN):  acetaminophen   IVPB .. 1000 milliGRAM(s) IV Intermittent once PRN Mild Pain (1 - 3)  fentaNYL    Injectable 25 MICROGram(s) IV Push every 5 minutes PRN Moderate Pain (4 - 6)  ondansetron Injectable 4 milliGRAM(s) IV Push once PRN Nausea and/or Vomiting  oxyCODONE    IR 5 milliGRAM(s) Oral once PRN Moderate Pain (4 - 6)      HEENT:  pupils equal and reactive, EOMI, no oropharyngeal lesions, erythema, exudates, oral thrush  NECK:   supple, no carotid bruits, no palpable lymph nodes, no thyromegaly  CV:  +S1, +S2, regular, no murmurs or rubs  RESP:   lungs clear to auscultation bilaterally, no wheezing, rales, rhonchi, good air entry bilaterally  GI:  abdomen soft, non-tender, non-distended, normal BS, no bruits, no abdominal masses, no palpable masses  EXT:  no clubbing, no cyanosis, no edema, no calf pain, swelling or erythema  NEURO:  AAOX3, no focal neurological deficits, follows all commands, able to move extremities spontaneously  SKIN:  no ulcers, lesions or rashes    LABS:                          12.3   11.73 )-----------( 222      ( 11 Jan 2024 12:23 )             37.6     01-11    136  |  106  |  24<H>  ----------------------------<  97  4.5   |  28  |  1.00    Ca    8.6      11 Jan 2024 12:23              Urinalysis Basic - ( 11 Jan 2024 12:23 )    Color: x / Appearance: x / SG: x / pH: x  Gluc: 97 mg/dL / Ketone: x  / Bili: x / Urobili: x   Blood: x / Protein: x / Nitrite: x   Leuk Esterase: x / RBC: x / WBC x   Sq Epi: x / Non Sq Epi: x / Bacteria: x                IMPRESSION: 64 yo with above pmh a/w:  # OA of left hip   # S/P left hip replacement      POD#1  - pain well controlled  - participated with PT   - plan for dc home  VTE prophylaxis  monitor CBC/BMP      # HTN  pt at high risk for fluctuations in B/P 2/2 anesthesia, pain, hypovolemia and use of narcotics, placing pt at high risk for MI/CVA  monitor B/P closely  therapeutic interchange for irbesartan   adjust anti-htn's accordingly    # HLD  cont statin     # anxiety,   cont zoloft     # Vte prophylaxis  ASA 81 BID   Venodynes  INC mobility      Thank you for the consult, will follow

## 2024-01-12 NOTE — PROGRESS NOTE ADULT - SUBJECTIVE AND OBJECTIVE BOX
Patient seen and examined at bedside. Pain well controlled with medication. Patient denies any numbness, tingling, weakness, or any other orthopaedic complaint. Denies N/V/CP/SOB.       VITAL SIGNS:  T(C): 36.7 (01-12-24 @ 04:05), Max: 36.8 (01-11-24 @ 07:00)  HR: 59 (01-12-24 @ 04:05) (54 - 78)  BP: 101/63 (01-12-24 @ 04:05) (87/62 - 141/72)  RR: 18 (01-12-24 @ 04:05) (12 - 27)  SpO2: 98% (01-12-24 @ 04:05) (94% - 100%)      LABS:                        12.3   11.73 )-----------( 222      ( 11 Jan 2024 12:23 )             37.6     01-11    136  |  106  |  24<H>  ----------------------------<  97  4.5   |  28  |  1.00    Ca    8.6      11 Jan 2024 12:23        Urinalysis Basic - ( 11 Jan 2024 12:23 )    Color: x / Appearance: x / SG: x / pH: x  Gluc: 97 mg/dL / Ketone: x  / Bili: x / Urobili: x   Blood: x / Protein: x / Nitrite: x   Leuk Esterase: x / RBC: x / WBC x   Sq Epi: x / Non Sq Epi: x / Bacteria: x            PE:   Gen: NAD, resting comfortably  LLE:   Dressing c/d/i  intact motor EHL/FHL/TA/GS   SILT L2-S1  Compartments soft and compressible  DP/PT pulses palpable  No calf TTP bilaterally    A/P:  63yMale Stable POD 1  s/p L CHARI      Follow up postoperative labs  WBAT, PT/OT, Posterior Precautions  Pain management PRN  Continue PPx antibiotics x24 hrs.   DVT PPx: A81  Incentive spirometry  Dispo: Home today

## 2024-01-12 NOTE — DISCHARGE NOTE NURSING/CASE MANAGEMENT/SOCIAL WORK - PATIENT PORTAL LINK FT
You can access the FollowMyHealth Patient Portal offered by [facility] by registering at the following website: [email or web address]. By joining [employer]’s FollowMyHealth portal, you will also be able to view your health information using other applications (apps) compatible with our system. You can access the FollowMyHealth Patient Portal offered by [facility] by registering at the following website: [email or web address]. By joining [employer]’s FollowMyHealth portal, you will also be able to view your health information using other applications (apps) compatible with our system.

## 2024-01-13 ENCOUNTER — TRANSCRIPTION ENCOUNTER (OUTPATIENT)
Age: 64
End: 2024-01-13

## 2024-01-16 ENCOUNTER — TRANSCRIPTION ENCOUNTER (OUTPATIENT)
Age: 64
End: 2024-01-16

## 2024-01-16 DIAGNOSIS — K21.9 GASTRO-ESOPHAGEAL REFLUX DISEASE WITHOUT ESOPHAGITIS: ICD-10-CM

## 2024-01-16 DIAGNOSIS — F41.9 ANXIETY DISORDER, UNSPECIFIED: ICD-10-CM

## 2024-01-16 DIAGNOSIS — E78.5 HYPERLIPIDEMIA, UNSPECIFIED: ICD-10-CM

## 2024-01-16 DIAGNOSIS — M16.12 UNILATERAL PRIMARY OSTEOARTHRITIS, LEFT HIP: ICD-10-CM

## 2024-01-16 DIAGNOSIS — I10 ESSENTIAL (PRIMARY) HYPERTENSION: ICD-10-CM

## 2024-01-23 LAB — SURGICAL PATHOLOGY STUDY: SIGNIFICANT CHANGE UP

## 2024-02-12 ENCOUNTER — APPOINTMENT (OUTPATIENT)
Dept: ORTHOPEDIC SURGERY | Facility: CLINIC | Age: 64
End: 2024-02-12

## 2024-02-12 ENCOUNTER — APPOINTMENT (OUTPATIENT)
Dept: ORTHOPEDIC SURGERY | Facility: CLINIC | Age: 64
End: 2024-02-12
Payer: COMMERCIAL

## 2024-02-12 VITALS — BODY MASS INDEX: 28.35 KG/M2 | HEIGHT: 63 IN | WEIGHT: 160 LBS

## 2024-02-12 DIAGNOSIS — Z96.642 PRESENCE OF LEFT ARTIFICIAL HIP JOINT: ICD-10-CM

## 2024-02-12 PROCEDURE — 99024 POSTOP FOLLOW-UP VISIT: CPT

## 2024-02-12 PROCEDURE — 73502 X-RAY EXAM HIP UNI 2-3 VIEWS: CPT

## 2024-02-14 NOTE — HISTORY OF PRESENT ILLNESS
[3] : 3 [Dull/Aching] : dull/aching [Sleep] : sleep [Rest] : rest [Lying in bed] : lying in bed [] : no [FreeTextEntry5] : 64 y/o M presents for PO #1 eval of the Lt. hip today. s/p LT. THR on DOS noted above. Pt reports recovery is going well with minimal pain.  [de-identified] : 1/11/24 [de-identified] : Lt. THR

## 2024-02-14 NOTE — ASSESSMENT
[FreeTextEntry1] : The patient is 4 weeks s/p a left CHARI on 1/11/24. The patient denies fever, chills, CP, SOB. The patient denies drainage or discharge from their incision. The patient is doing well postoperatively. He denies new trauma. He denies paresthesias. The patient is completing PT and able to drive at this time. He is still observing total hip precautions. He is using tylenol as needed. The patient is able to complete simple ADLs.   Left hip exam: The patient presents with no apparent distress. Neurovascularly intact. Sensation intact to left lower extremity. Operative incision is clean, dry, and intact. No active drainage or discharge. + straight leg raise. Tender to palpation to lateral hip. 5/5 abductor strength. Patient is ambulating without pain.  Left hip xrays taken today in office, 1 view AP pelvis and two views hip, NWB: Left CHARI hardware intact without loosening or movement. No loose bodies. No obvious tumors, masses, or lesions.  The patient is doing well. He will continue with PT and observe total hip precautions. He will return in 8 weeks with Dr. Jacinto.

## 2024-02-16 RX ORDER — AMOXICILLIN 500 MG/1
500 TABLET, FILM COATED ORAL
Qty: 4 | Refills: 5 | Status: ACTIVE | COMMUNITY
Start: 2024-02-16 | End: 1900-01-01

## 2024-04-08 ENCOUNTER — TRANSCRIPTION ENCOUNTER (OUTPATIENT)
Age: 64
End: 2024-04-08

## 2024-04-17 ENCOUNTER — APPOINTMENT (OUTPATIENT)
Dept: ORTHOPEDIC SURGERY | Facility: CLINIC | Age: 64
End: 2024-04-17
Payer: COMMERCIAL

## 2024-04-17 DIAGNOSIS — M16.12 UNILATERAL PRIMARY OSTEOARTHRITIS, LEFT HIP: ICD-10-CM

## 2024-04-17 PROCEDURE — 99213 OFFICE O/P EST LOW 20 MIN: CPT

## 2024-04-17 NOTE — HISTORY OF PRESENT ILLNESS
[3] : 3 [Dull/Aching] : dull/aching [Sleep] : sleep [Rest] : rest [Lying in bed] : lying in bed [] : no [FreeTextEntry5] : 64 y/o M presents for PO #2 eval of the Lt. hip today. s/p LT. THR on DOS noted above. Pt reports recovery is going well with minimal pain.  [de-identified] : Lt. THR [de-identified] : 1/11/24

## 2024-04-17 NOTE — ASSESSMENT
[FreeTextEntry1] : The patient comes in today for follow-up on his left hip.  He is now 3 months out from his left total hip replacement on January 11, 2024.  He is currently finishing up his physical therapy.  He has no complaints of pain.  He is walking well without assistive devices.  He still has a slight limp when he walks but mostly when he goes from sitting to standing.  This is not a painful limp and may be habitual.  Examination of the left lower extremity reveals normal neurovascular exam.  Examination left hip reveals equal leg lengths.  He has a well-healed scar with no signs of infection or DVT.  His range of motion is good.  He walks with a minimal limp.  He has no point tenderness.  He has no weakness of hip flexion or abduction.  Plan at this time is to finish up his physical therapy.  He can remove his total hip precautions at this point and I will see him back in the office as needed.

## 2024-09-25 ENCOUNTER — APPOINTMENT (OUTPATIENT)
Dept: UROLOGY | Facility: CLINIC | Age: 64
End: 2024-09-25
Payer: COMMERCIAL

## 2024-09-25 VITALS
OXYGEN SATURATION: 96 % | RESPIRATION RATE: 16 BRPM | WEIGHT: 160 LBS | SYSTOLIC BLOOD PRESSURE: 143 MMHG | BODY MASS INDEX: 28.35 KG/M2 | HEART RATE: 65 BPM | DIASTOLIC BLOOD PRESSURE: 86 MMHG | HEIGHT: 63 IN

## 2024-09-25 DIAGNOSIS — R97.20 ELEVATED PROSTATE, SPECIFIC ANTIGEN [PSA]: ICD-10-CM

## 2024-09-25 DIAGNOSIS — N40.0 BENIGN PROSTATIC HYPERPLASIA WITHOUT LOWER URINARY TRACT SYMPMS: ICD-10-CM

## 2024-09-25 PROCEDURE — 99213 OFFICE O/P EST LOW 20 MIN: CPT

## 2024-09-25 PROCEDURE — 99244 OFF/OP CNSLTJ NEW/EST MOD 40: CPT

## 2024-09-25 RX ORDER — TAMSULOSIN HYDROCHLORIDE 0.4 MG/1
0.4 CAPSULE ORAL
Qty: 90 | Refills: 3 | Status: ACTIVE | COMMUNITY
Start: 2024-09-25 | End: 1900-01-01

## 2024-09-25 RX ORDER — SULFAMETHOXAZOLE AND TRIMETHOPRIM 800; 160 MG/1; MG/1
800-160 TABLET ORAL TWICE DAILY
Qty: 20 | Refills: 0 | Status: ACTIVE | COMMUNITY
Start: 2024-09-25 | End: 1900-01-01

## 2024-09-25 NOTE — HISTORY OF PRESENT ILLNESS
[FreeTextEntry1] : This 63 year old male presents with a complaint of elevated PSA. He does not recall the level. He reports increased urinary frequency and nocturia but denies any other urinary complaints and is doing well otherwise.

## 2024-09-25 NOTE — ASSESSMENT
[FreeTextEntry1] : Urine studies are obtained. Patient will be placed on Tamsulosin and an antibiotic and repeat his PSA in 10 days. He will return to the office for a transrectal prostate ultrasound.

## 2024-10-03 LAB
APPEARANCE: CLEAR
BACTERIA: NEGATIVE /HPF
BILIRUBIN URINE: NEGATIVE
BLOOD URINE: NEGATIVE
CAST: 0 /LPF
COLOR: YELLOW
EPITHELIAL CELLS: 0 /HPF
GLUCOSE QUALITATIVE U: NEGATIVE MG/DL
KETONES URINE: NEGATIVE MG/DL
LEUKOCYTE ESTERASE URINE: NEGATIVE
MICROSCOPIC-UA: NORMAL
NITRITE URINE: NEGATIVE
PH URINE: 6
PROTEIN URINE: NEGATIVE MG/DL
RED BLOOD CELLS URINE: 0 /HPF
SPECIFIC GRAVITY URINE: 1.01
UROBILINOGEN URINE: 0.2 MG/DL
WHITE BLOOD CELLS URINE: 0 /HPF

## 2024-10-04 LAB — BACTERIA UR CULT: NORMAL

## 2024-10-17 ENCOUNTER — APPOINTMENT (OUTPATIENT)
Dept: UROLOGY | Facility: CLINIC | Age: 64
End: 2024-10-17
Payer: COMMERCIAL

## 2024-10-17 VITALS
HEIGHT: 62 IN | SYSTOLIC BLOOD PRESSURE: 117 MMHG | HEART RATE: 67 BPM | TEMPERATURE: 97.8 F | RESPIRATION RATE: 15 BRPM | BODY MASS INDEX: 29.44 KG/M2 | OXYGEN SATURATION: 97 % | WEIGHT: 160 LBS | DIASTOLIC BLOOD PRESSURE: 76 MMHG

## 2024-10-17 DIAGNOSIS — N40.0 BENIGN PROSTATIC HYPERPLASIA WITHOUT LOWER URINARY TRACT SYMPMS: ICD-10-CM

## 2024-10-17 DIAGNOSIS — N52.9 MALE ERECTILE DYSFUNCTION, UNSPECIFIED: ICD-10-CM

## 2024-10-17 PROCEDURE — 99212 OFFICE O/P EST SF 10 MIN: CPT | Mod: 25

## 2024-10-17 PROCEDURE — 76872 US TRANSRECTAL: CPT

## 2024-10-17 PROCEDURE — 76857 US EXAM PELVIC LIMITED: CPT

## 2024-10-18 LAB
PSA SERPL-MCNC: 1.4 NG/ML
TESTOST SERPL-MCNC: 733 NG/DL

## 2024-11-11 ENCOUNTER — APPOINTMENT (OUTPATIENT)
Dept: UROLOGY | Facility: CLINIC | Age: 64
End: 2024-11-11
Payer: COMMERCIAL

## 2024-11-11 VITALS
SYSTOLIC BLOOD PRESSURE: 135 MMHG | RESPIRATION RATE: 14 BRPM | HEIGHT: 63 IN | OXYGEN SATURATION: 97 % | BODY MASS INDEX: 28 KG/M2 | TEMPERATURE: 98 F | DIASTOLIC BLOOD PRESSURE: 96 MMHG | HEART RATE: 87 BPM | WEIGHT: 158 LBS

## 2024-11-11 DIAGNOSIS — N52.9 MALE ERECTILE DYSFUNCTION, UNSPECIFIED: ICD-10-CM

## 2024-11-11 PROCEDURE — 54235 NJX CORPORA CAVERNOSA RX AGT: CPT

## 2024-11-11 PROCEDURE — 93980 PENILE VASCULAR STUDY: CPT

## 2024-11-11 RX ORDER — TADALAFIL 5 MG/1
5 TABLET ORAL
Qty: 30 | Refills: 11 | Status: ACTIVE | COMMUNITY
Start: 2024-11-11 | End: 1900-01-01